# Patient Record
Sex: FEMALE | Race: WHITE | NOT HISPANIC OR LATINO | Employment: OTHER | URBAN - METROPOLITAN AREA
[De-identification: names, ages, dates, MRNs, and addresses within clinical notes are randomized per-mention and may not be internally consistent; named-entity substitution may affect disease eponyms.]

---

## 2017-01-11 ENCOUNTER — OFFICE VISIT - HEALTHEAST (OUTPATIENT)
Dept: CARDIOLOGY | Facility: CLINIC | Age: 72
End: 2017-01-11

## 2017-01-11 DIAGNOSIS — R07.9 CHEST PAIN, UNSPECIFIED TYPE: ICD-10-CM

## 2017-01-11 DIAGNOSIS — I10 ESSENTIAL HYPERTENSION, BENIGN: ICD-10-CM

## 2017-01-11 DIAGNOSIS — R94.39 ABNORMAL NUCLEAR STRESS TEST: ICD-10-CM

## 2017-01-11 ASSESSMENT — MIFFLIN-ST. JEOR: SCORE: 1486.99

## 2017-01-17 ENCOUNTER — COMMUNICATION - HEALTHEAST (OUTPATIENT)
Dept: FAMILY MEDICINE | Facility: CLINIC | Age: 72
End: 2017-01-17

## 2017-01-19 ENCOUNTER — HOSPITAL ENCOUNTER (OUTPATIENT)
Dept: CARDIOLOGY | Facility: HOSPITAL | Age: 72
Discharge: HOME | End: 2017-01-19
Attending: INTERNAL MEDICINE

## 2017-01-19 ENCOUNTER — HOSPITAL ENCOUNTER (OUTPATIENT)
Dept: NUCLEAR MEDICINE | Facility: HOSPITAL | Age: 72
Discharge: HOME | End: 2017-01-19
Attending: INTERNAL MEDICINE

## 2017-01-19 DIAGNOSIS — R07.9 CHEST PAIN, UNSPECIFIED TYPE: ICD-10-CM

## 2017-01-19 DIAGNOSIS — R94.39 ABNORMAL NUCLEAR STRESS TEST: ICD-10-CM

## 2017-01-19 LAB
CV STRESS CURRENT BP HE: NORMAL
CV STRESS CURRENT HR HE: 53
CV STRESS CURRENT HR HE: 57
CV STRESS CURRENT HR HE: 67
CV STRESS CURRENT HR HE: 69
CV STRESS CURRENT HR HE: 69
CV STRESS CURRENT HR HE: 72
CV STRESS CURRENT HR HE: 73
CV STRESS CURRENT HR HE: 74
CV STRESS CURRENT HR HE: 80
CV STRESS CURRENT HR HE: 81
CV STRESS CURRENT HR HE: 82
CV STRESS CURRENT HR HE: 83
CV STRESS DEVIATION TIME HE: NORMAL
CV STRESS EXERCISE STAGE HE: NORMAL
CV STRESS FINAL RESTING BP HE: NORMAL
CV STRESS FINAL RESTING HR HE: 67
CV STRESS MAX HR HE: 82
CV STRESS MAX TREADMILL GRADE HE: 0
CV STRESS MAX TREADMILL SPEED HE: 0
CV STRESS PEAK DIA BP HE: NORMAL
CV STRESS PEAK SYS BP HE: NORMAL
CV STRESS PHASE HE: NORMAL
CV STRESS PROTOCOL HE: NORMAL
CV STRESS RESTING PT POSITION HE: NORMAL
CV STRESS ST DEVIATION AMOUNT HE: NORMAL
CV STRESS ST DEVIATION ELEVATION HE: NORMAL
CV STRESS ST EVELATION AMOUNT HE: NORMAL
CV STRESS TEST TYPE HE: NORMAL
CV STRESS TOTAL STAGE TIME MIN 1 HE: NORMAL
NUC STRESS EJECTION FRACTION: 62 %
STRESS ECHO BASELINE BP: NORMAL
STRESS ECHO BASELINE HR: 53
STRESS ECHO LAST STRESS BP: NORMAL
STRESS ECHO LAST STRESS HR: 80

## 2017-02-06 ENCOUNTER — OFFICE VISIT - HEALTHEAST (OUTPATIENT)
Dept: FAMILY MEDICINE | Facility: CLINIC | Age: 72
End: 2017-02-06

## 2017-02-06 DIAGNOSIS — R07.89 ATYPICAL CHEST PAIN: ICD-10-CM

## 2017-02-06 DIAGNOSIS — Z00.00 HEALTH CARE MAINTENANCE: ICD-10-CM

## 2017-02-06 DIAGNOSIS — E11.9 DM2 (DIABETES MELLITUS, TYPE 2) (H): ICD-10-CM

## 2017-02-06 LAB — HBA1C MFR BLD: 5.6 % (ref 3.5–6)

## 2017-02-06 ASSESSMENT — MIFFLIN-ST. JEOR: SCORE: 1477.92

## 2017-02-07 ENCOUNTER — COMMUNICATION - HEALTHEAST (OUTPATIENT)
Dept: FAMILY MEDICINE | Facility: CLINIC | Age: 72
End: 2017-02-07

## 2017-02-28 ENCOUNTER — RECORDS - HEALTHEAST (OUTPATIENT)
Dept: ADMINISTRATIVE | Facility: OTHER | Age: 72
End: 2017-02-28

## 2017-03-03 ENCOUNTER — COMMUNICATION - HEALTHEAST (OUTPATIENT)
Dept: FAMILY MEDICINE | Facility: CLINIC | Age: 72
End: 2017-03-03

## 2017-03-03 DIAGNOSIS — I10 ESSENTIAL HYPERTENSION: ICD-10-CM

## 2017-03-10 ENCOUNTER — COMMUNICATION - HEALTHEAST (OUTPATIENT)
Dept: FAMILY MEDICINE | Facility: CLINIC | Age: 72
End: 2017-03-10

## 2017-03-10 ENCOUNTER — AMBULATORY - HEALTHEAST (OUTPATIENT)
Dept: FAMILY MEDICINE | Facility: CLINIC | Age: 72
End: 2017-03-10

## 2017-03-10 DIAGNOSIS — I10 ESSENTIAL HYPERTENSION: ICD-10-CM

## 2017-08-29 ENCOUNTER — OFFICE VISIT - HEALTHEAST (OUTPATIENT)
Dept: FAMILY MEDICINE | Facility: CLINIC | Age: 72
End: 2017-08-29

## 2017-08-29 DIAGNOSIS — S82.892A CLOSED LEFT ANKLE FRACTURE: ICD-10-CM

## 2017-08-30 ENCOUNTER — RECORDS - HEALTHEAST (OUTPATIENT)
Dept: ADMINISTRATIVE | Facility: OTHER | Age: 72
End: 2017-08-30

## 2017-08-31 ENCOUNTER — RECORDS - HEALTHEAST (OUTPATIENT)
Dept: ADMINISTRATIVE | Facility: OTHER | Age: 72
End: 2017-08-31

## 2017-08-31 ENCOUNTER — COMMUNICATION - HEALTHEAST (OUTPATIENT)
Dept: FAMILY MEDICINE | Facility: CLINIC | Age: 72
End: 2017-08-31

## 2017-08-31 ENCOUNTER — RECORDS - HEALTHEAST (OUTPATIENT)
Dept: BONE DENSITY | Facility: CLINIC | Age: 72
End: 2017-08-31

## 2017-08-31 DIAGNOSIS — Z78.0 ASYMPTOMATIC MENOPAUSAL STATE: ICD-10-CM

## 2017-08-31 DIAGNOSIS — S82.892A OTHER FRACTURE OF LEFT LOWER LEG, INITIAL ENCOUNTER FOR CLOSED FRACTURE: ICD-10-CM

## 2017-08-31 DIAGNOSIS — I10 ESSENTIAL HYPERTENSION: ICD-10-CM

## 2017-09-20 ENCOUNTER — RECORDS - HEALTHEAST (OUTPATIENT)
Dept: ADMINISTRATIVE | Facility: OTHER | Age: 72
End: 2017-09-20

## 2017-10-09 ENCOUNTER — RECORDS - HEALTHEAST (OUTPATIENT)
Dept: ADMINISTRATIVE | Facility: OTHER | Age: 72
End: 2017-10-09

## 2017-10-23 ENCOUNTER — RECORDS - HEALTHEAST (OUTPATIENT)
Dept: ADMINISTRATIVE | Facility: OTHER | Age: 72
End: 2017-10-23

## 2017-11-13 ENCOUNTER — RECORDS - HEALTHEAST (OUTPATIENT)
Dept: ADMINISTRATIVE | Facility: OTHER | Age: 72
End: 2017-11-13

## 2017-11-20 ENCOUNTER — RECORDS - HEALTHEAST (OUTPATIENT)
Dept: ADMINISTRATIVE | Facility: OTHER | Age: 72
End: 2017-11-20

## 2017-11-27 ENCOUNTER — HOSPITAL ENCOUNTER (OUTPATIENT)
Dept: CT IMAGING | Facility: HOSPITAL | Age: 72
Discharge: HOME | End: 2017-11-27
Attending: PHYSICIAN ASSISTANT

## 2017-11-27 DIAGNOSIS — K52.3 COLITIS, INDETERMINATE: ICD-10-CM

## 2018-01-30 ENCOUNTER — RECORDS - HEALTHEAST (OUTPATIENT)
Dept: ADMINISTRATIVE | Facility: OTHER | Age: 73
End: 2018-01-30

## 2018-02-23 ENCOUNTER — OFFICE VISIT - HEALTHEAST (OUTPATIENT)
Dept: FAMILY MEDICINE | Facility: CLINIC | Age: 73
End: 2018-02-23

## 2018-02-23 DIAGNOSIS — R04.0 EPISTAXIS: ICD-10-CM

## 2018-02-23 RX ORDER — MESALAMINE 1.2 G/1
1.2 TABLET, DELAYED RELEASE ORAL DAILY
Refills: 5 | Status: SHIPPED | COMMUNITY
Start: 2018-01-31

## 2018-02-23 ASSESSMENT — MIFFLIN-ST. JEOR: SCORE: 1494.02

## 2018-03-07 ENCOUNTER — OFFICE VISIT - HEALTHEAST (OUTPATIENT)
Dept: OTOLARYNGOLOGY | Facility: CLINIC | Age: 73
End: 2018-03-07

## 2018-03-07 DIAGNOSIS — R04.0 EPISTAXIS: ICD-10-CM

## 2018-03-14 ENCOUNTER — COMMUNICATION - HEALTHEAST (OUTPATIENT)
Dept: FAMILY MEDICINE | Facility: CLINIC | Age: 73
End: 2018-03-14

## 2018-03-14 DIAGNOSIS — I10 ESSENTIAL HYPERTENSION: ICD-10-CM

## 2018-08-14 ENCOUNTER — COMMUNICATION - HEALTHEAST (OUTPATIENT)
Dept: FAMILY MEDICINE | Facility: CLINIC | Age: 73
End: 2018-08-14

## 2018-08-14 DIAGNOSIS — I10 ESSENTIAL HYPERTENSION: ICD-10-CM

## 2018-09-05 ENCOUNTER — OFFICE VISIT - HEALTHEAST (OUTPATIENT)
Dept: OTOLARYNGOLOGY | Facility: CLINIC | Age: 73
End: 2018-09-05

## 2018-09-05 DIAGNOSIS — R04.0 EPISTAXIS: ICD-10-CM

## 2018-09-19 ENCOUNTER — OFFICE VISIT - HEALTHEAST (OUTPATIENT)
Dept: FAMILY MEDICINE | Facility: CLINIC | Age: 73
End: 2018-09-19

## 2018-09-19 DIAGNOSIS — I10 ESSENTIAL HYPERTENSION, BENIGN: ICD-10-CM

## 2018-09-19 DIAGNOSIS — Z12.31 VISIT FOR SCREENING MAMMOGRAM: ICD-10-CM

## 2018-09-19 DIAGNOSIS — Z00.00 MEDICARE ANNUAL WELLNESS VISIT, SUBSEQUENT: ICD-10-CM

## 2018-09-19 DIAGNOSIS — J45.901 ASTHMA EXACERBATION: ICD-10-CM

## 2018-09-19 DIAGNOSIS — K86.1 IDIOPATHIC CHRONIC PANCREATITIS (H): ICD-10-CM

## 2018-09-19 LAB
ALBUMIN SERPL-MCNC: 3.9 G/DL (ref 3.5–5)
ALBUMIN UR-MCNC: NEGATIVE MG/DL
ALP SERPL-CCNC: 88 U/L (ref 45–120)
ALT SERPL W P-5'-P-CCNC: 16 U/L (ref 0–45)
ANION GAP SERPL CALCULATED.3IONS-SCNC: 8 MMOL/L (ref 5–18)
APPEARANCE UR: CLEAR
AST SERPL W P-5'-P-CCNC: 46 U/L (ref 0–40)
BACTERIA #/AREA URNS HPF: ABNORMAL HPF
BILIRUB SERPL-MCNC: 1 MG/DL (ref 0–1)
BILIRUB UR QL STRIP: NEGATIVE
BUN SERPL-MCNC: 18 MG/DL (ref 8–28)
CALCIUM SERPL-MCNC: 10.5 MG/DL (ref 8.5–10.5)
CHLORIDE BLD-SCNC: 105 MMOL/L (ref 98–107)
CHOLEST SERPL-MCNC: 208 MG/DL
CO2 SERPL-SCNC: 28 MMOL/L (ref 22–31)
COLOR UR AUTO: YELLOW
CREAT SERPL-MCNC: 0.73 MG/DL (ref 0.6–1.1)
FASTING STATUS PATIENT QL REPORTED: YES
GFR SERPL CREATININE-BSD FRML MDRD: >60 ML/MIN/1.73M2
GLUCOSE BLD-MCNC: 86 MG/DL (ref 70–125)
GLUCOSE UR STRIP-MCNC: NEGATIVE MG/DL
HDLC SERPL-MCNC: 54 MG/DL
HGB UR QL STRIP: NEGATIVE
KETONES UR STRIP-MCNC: NEGATIVE MG/DL
LDLC SERPL CALC-MCNC: 139 MG/DL
LEUKOCYTE ESTERASE UR QL STRIP: ABNORMAL
MUCOUS THREADS #/AREA URNS LPF: ABNORMAL LPF
NITRATE UR QL: NEGATIVE
PH UR STRIP: 7 [PH] (ref 5–8)
POTASSIUM BLD-SCNC: 4.6 MMOL/L (ref 3.5–5)
PROT SERPL-MCNC: 7.3 G/DL (ref 6–8)
RBC #/AREA URNS AUTO: ABNORMAL HPF
SODIUM SERPL-SCNC: 141 MMOL/L (ref 136–145)
SP GR UR STRIP: 1.01 (ref 1–1.03)
SQUAMOUS #/AREA URNS AUTO: ABNORMAL LPF
TRIGL SERPL-MCNC: 76 MG/DL
TSH SERPL DL<=0.005 MIU/L-ACNC: 0.86 UIU/ML (ref 0.3–5)
UROBILINOGEN UR STRIP-ACNC: ABNORMAL
WBC #/AREA URNS AUTO: ABNORMAL HPF
WBC CLUMPS #/AREA URNS HPF: PRESENT /[HPF]

## 2018-09-19 ASSESSMENT — MIFFLIN-ST. JEOR: SCORE: 1500.6

## 2018-09-20 LAB
ATRIAL RATE - MUSE: 51 BPM
BACTERIA SPEC CULT: NORMAL
DIASTOLIC BLOOD PRESSURE - MUSE: NORMAL MMHG
INTERPRETATION ECG - MUSE: NORMAL
P AXIS - MUSE: 85 DEGREES
PR INTERVAL - MUSE: 150 MS
QRS DURATION - MUSE: 90 MS
QT - MUSE: 456 MS
QTC - MUSE: 420 MS
R AXIS - MUSE: 42 DEGREES
SYSTOLIC BLOOD PRESSURE - MUSE: NORMAL MMHG
T AXIS - MUSE: 43 DEGREES
VENTRICULAR RATE- MUSE: 51 BPM

## 2018-11-01 ENCOUNTER — COMMUNICATION - HEALTHEAST (OUTPATIENT)
Dept: FAMILY MEDICINE | Facility: CLINIC | Age: 73
End: 2018-11-01

## 2018-11-01 DIAGNOSIS — I10 ESSENTIAL HYPERTENSION: ICD-10-CM

## 2018-12-10 ENCOUNTER — COMMUNICATION - HEALTHEAST (OUTPATIENT)
Dept: FAMILY MEDICINE | Facility: CLINIC | Age: 73
End: 2018-12-10

## 2018-12-14 ENCOUNTER — OFFICE VISIT - HEALTHEAST (OUTPATIENT)
Dept: FAMILY MEDICINE | Facility: CLINIC | Age: 73
End: 2018-12-14

## 2018-12-14 DIAGNOSIS — R73.01 ELEVATED FASTING GLUCOSE: ICD-10-CM

## 2018-12-14 DIAGNOSIS — R80.9 MICROALBUMINURIA: ICD-10-CM

## 2018-12-14 DIAGNOSIS — R73.03 PREDIABETES: ICD-10-CM

## 2018-12-14 DIAGNOSIS — E66.01 MORBID OBESITY (H): ICD-10-CM

## 2018-12-14 LAB
CREAT UR-MCNC: 28.1 MG/DL
HBA1C MFR BLD: 5.9 % (ref 3.5–6)
MICROALBUMIN UR-MCNC: 0.58 MG/DL (ref 0–1.99)
MICROALBUMIN/CREAT UR: 20.6 MG/G

## 2018-12-14 RX ORDER — CHLORAL HYDRATE 500 MG
1 CAPSULE ORAL
Status: SHIPPED | COMMUNITY
Start: 2018-12-14 | End: 2024-09-19

## 2018-12-14 ASSESSMENT — MIFFLIN-ST. JEOR: SCORE: 1495.5

## 2019-01-03 ENCOUNTER — RECORDS - HEALTHEAST (OUTPATIENT)
Dept: ADMINISTRATIVE | Facility: OTHER | Age: 74
End: 2019-01-03

## 2019-03-06 ENCOUNTER — COMMUNICATION - HEALTHEAST (OUTPATIENT)
Dept: FAMILY MEDICINE | Facility: CLINIC | Age: 74
End: 2019-03-06

## 2019-03-06 DIAGNOSIS — I10 ESSENTIAL HYPERTENSION: ICD-10-CM

## 2019-06-04 ENCOUNTER — COMMUNICATION - HEALTHEAST (OUTPATIENT)
Dept: FAMILY MEDICINE | Facility: CLINIC | Age: 74
End: 2019-06-04

## 2019-06-04 DIAGNOSIS — I10 ESSENTIAL HYPERTENSION: ICD-10-CM

## 2019-07-29 ENCOUNTER — COMMUNICATION - HEALTHEAST (OUTPATIENT)
Dept: FAMILY MEDICINE | Facility: CLINIC | Age: 74
End: 2019-07-29

## 2019-07-29 DIAGNOSIS — I10 ESSENTIAL HYPERTENSION: ICD-10-CM

## 2019-09-24 ENCOUNTER — RECORDS - HEALTHEAST (OUTPATIENT)
Dept: ADMINISTRATIVE | Facility: OTHER | Age: 74
End: 2019-09-24

## 2019-09-25 ENCOUNTER — OFFICE VISIT - HEALTHEAST (OUTPATIENT)
Dept: FAMILY MEDICINE | Facility: CLINIC | Age: 74
End: 2019-09-25

## 2019-09-25 DIAGNOSIS — K57.32 DIVERTICULITIS OF COLON: ICD-10-CM

## 2019-09-25 DIAGNOSIS — E66.811 CLASS 1 OBESITY DUE TO EXCESS CALORIES WITH BODY MASS INDEX (BMI) OF 31.0 TO 31.9 IN ADULT, UNSPECIFIED WHETHER SERIOUS COMORBIDITY PRESENT: ICD-10-CM

## 2019-09-25 DIAGNOSIS — E66.09 CLASS 1 OBESITY DUE TO EXCESS CALORIES WITH BODY MASS INDEX (BMI) OF 31.0 TO 31.9 IN ADULT, UNSPECIFIED WHETHER SERIOUS COMORBIDITY PRESENT: ICD-10-CM

## 2019-09-25 DIAGNOSIS — Z00.00 MEDICARE ANNUAL WELLNESS VISIT, SUBSEQUENT: ICD-10-CM

## 2019-09-25 DIAGNOSIS — J45.901 ASTHMA EXACERBATION: ICD-10-CM

## 2019-09-25 DIAGNOSIS — Z23 ENCOUNTER FOR IMMUNIZATION: ICD-10-CM

## 2019-09-25 DIAGNOSIS — Z12.31 VISIT FOR SCREENING MAMMOGRAM: ICD-10-CM

## 2019-09-25 DIAGNOSIS — E03.9 ACQUIRED HYPOTHYROIDISM: ICD-10-CM

## 2019-09-25 DIAGNOSIS — I10 ESSENTIAL HYPERTENSION, BENIGN: ICD-10-CM

## 2019-09-25 LAB
ALBUMIN SERPL-MCNC: 3.7 G/DL (ref 3.5–5)
ALBUMIN UR-MCNC: NEGATIVE MG/DL
ALP SERPL-CCNC: 107 U/L (ref 45–120)
ALT SERPL W P-5'-P-CCNC: 24 U/L (ref 0–45)
ANION GAP SERPL CALCULATED.3IONS-SCNC: 6 MMOL/L (ref 5–18)
APPEARANCE UR: ABNORMAL
AST SERPL W P-5'-P-CCNC: 52 U/L (ref 0–40)
BACTERIA #/AREA URNS HPF: ABNORMAL HPF
BILIRUB SERPL-MCNC: 0.7 MG/DL (ref 0–1)
BILIRUB UR QL STRIP: NEGATIVE
BUN SERPL-MCNC: 10 MG/DL (ref 8–28)
CALCIUM SERPL-MCNC: 10.3 MG/DL (ref 8.5–10.5)
CHLORIDE BLD-SCNC: 105 MMOL/L (ref 98–107)
CHOLEST SERPL-MCNC: 194 MG/DL
CO2 SERPL-SCNC: 29 MMOL/L (ref 22–31)
COLOR UR AUTO: YELLOW
CREAT SERPL-MCNC: 0.73 MG/DL (ref 0.6–1.1)
ERYTHROCYTE [DISTWIDTH] IN BLOOD BY AUTOMATED COUNT: 12.5 % (ref 11–14.5)
FASTING STATUS PATIENT QL REPORTED: YES
GFR SERPL CREATININE-BSD FRML MDRD: >60 ML/MIN/1.73M2
GLUCOSE BLD-MCNC: 110 MG/DL (ref 70–125)
GLUCOSE UR STRIP-MCNC: NEGATIVE MG/DL
HCT VFR BLD AUTO: 44.7 % (ref 35–47)
HDLC SERPL-MCNC: 50 MG/DL
HGB BLD-MCNC: 15.6 G/DL (ref 12–16)
HGB UR QL STRIP: NEGATIVE
KETONES UR STRIP-MCNC: NEGATIVE MG/DL
LDLC SERPL CALC-MCNC: 129 MG/DL
LEUKOCYTE ESTERASE UR QL STRIP: ABNORMAL
MCH RBC QN AUTO: 30.8 PG (ref 27–34)
MCHC RBC AUTO-ENTMCNC: 35 G/DL (ref 32–36)
MCV RBC AUTO: 88 FL (ref 80–100)
NITRATE UR QL: NEGATIVE
PH UR STRIP: 6 [PH] (ref 5–8)
PLATELET # BLD AUTO: 460 THOU/UL (ref 140–440)
PMV BLD AUTO: 7.8 FL (ref 7–10)
POTASSIUM BLD-SCNC: 4.6 MMOL/L (ref 3.5–5)
PROT SERPL-MCNC: 7.4 G/DL (ref 6–8)
RBC # BLD AUTO: 5.07 MILL/UL (ref 3.8–5.4)
RBC #/AREA URNS AUTO: ABNORMAL HPF
SODIUM SERPL-SCNC: 140 MMOL/L (ref 136–145)
SP GR UR STRIP: 1.02 (ref 1–1.03)
SQUAMOUS #/AREA URNS AUTO: ABNORMAL LPF
TRIGL SERPL-MCNC: 74 MG/DL
TSH SERPL DL<=0.005 MIU/L-ACNC: 0.92 UIU/ML (ref 0.3–5)
UROBILINOGEN UR STRIP-ACNC: ABNORMAL
WBC #/AREA URNS AUTO: ABNORMAL HPF
WBC CLUMPS #/AREA URNS HPF: PRESENT /[HPF]
WBC: 9.2 THOU/UL (ref 4–11)

## 2019-09-25 RX ORDER — ALBUTEROL SULFATE 90 UG/1
2 AEROSOL, METERED RESPIRATORY (INHALATION) EVERY 6 HOURS PRN
Qty: 2 INHALER | Refills: 3 | Status: SHIPPED | OUTPATIENT
Start: 2019-09-25 | End: 2022-12-16

## 2019-09-25 ASSESSMENT — ANXIETY QUESTIONNAIRES
1. FEELING NERVOUS, ANXIOUS, OR ON EDGE: NOT AT ALL
2. NOT BEING ABLE TO STOP OR CONTROL WORRYING: NOT AT ALL

## 2019-09-25 ASSESSMENT — MIFFLIN-ST. JEOR: SCORE: 1444.58

## 2019-09-26 LAB — BACTERIA SPEC CULT: NO GROWTH

## 2019-10-02 ENCOUNTER — HOSPITAL ENCOUNTER (OUTPATIENT)
Dept: CT IMAGING | Facility: HOSPITAL | Age: 74
Discharge: HOME | End: 2019-10-02
Attending: PHYSICIAN ASSISTANT

## 2019-10-02 DIAGNOSIS — R10.32 LLQ ABDOMINAL PAIN: ICD-10-CM

## 2019-11-06 ENCOUNTER — HOSPITAL ENCOUNTER (OUTPATIENT)
Dept: MAMMOGRAPHY | Facility: CLINIC | Age: 74
Discharge: HOME | End: 2019-11-06
Attending: FAMILY MEDICINE

## 2019-11-06 DIAGNOSIS — Z12.31 VISIT FOR SCREENING MAMMOGRAM: ICD-10-CM

## 2019-11-27 ENCOUNTER — RECORDS - HEALTHEAST (OUTPATIENT)
Dept: ADMINISTRATIVE | Facility: OTHER | Age: 74
End: 2019-11-27

## 2019-12-01 ENCOUNTER — COMMUNICATION - HEALTHEAST (OUTPATIENT)
Dept: FAMILY MEDICINE | Facility: CLINIC | Age: 74
End: 2019-12-01

## 2019-12-01 DIAGNOSIS — I10 ESSENTIAL HYPERTENSION: ICD-10-CM

## 2019-12-05 ENCOUNTER — RECORDS - HEALTHEAST (OUTPATIENT)
Dept: ADMINISTRATIVE | Facility: OTHER | Age: 74
End: 2019-12-05

## 2020-02-11 ENCOUNTER — RECORDS - HEALTHEAST (OUTPATIENT)
Dept: ADMINISTRATIVE | Facility: OTHER | Age: 75
End: 2020-02-11

## 2020-05-11 ENCOUNTER — COMMUNICATION - HEALTHEAST (OUTPATIENT)
Dept: FAMILY MEDICINE | Facility: CLINIC | Age: 75
End: 2020-05-11

## 2020-05-11 DIAGNOSIS — I10 ESSENTIAL HYPERTENSION: ICD-10-CM

## 2020-05-12 ENCOUNTER — COMMUNICATION - HEALTHEAST (OUTPATIENT)
Dept: FAMILY MEDICINE | Facility: CLINIC | Age: 75
End: 2020-05-12

## 2020-05-12 DIAGNOSIS — I10 ESSENTIAL HYPERTENSION: ICD-10-CM

## 2020-11-03 ENCOUNTER — COMMUNICATION - HEALTHEAST (OUTPATIENT)
Dept: FAMILY MEDICINE | Facility: CLINIC | Age: 75
End: 2020-11-03

## 2020-11-03 DIAGNOSIS — I10 ESSENTIAL HYPERTENSION: ICD-10-CM

## 2020-11-06 RX ORDER — CARVEDILOL 12.5 MG/1
TABLET ORAL
Qty: 180 TABLET | Refills: 0 | Status: SHIPPED | OUTPATIENT
Start: 2020-11-06 | End: 2022-12-08

## 2020-12-19 ENCOUNTER — COMMUNICATION - HEALTHEAST (OUTPATIENT)
Dept: FAMILY MEDICINE | Facility: CLINIC | Age: 75
End: 2020-12-19

## 2020-12-19 DIAGNOSIS — I10 ESSENTIAL HYPERTENSION: ICD-10-CM

## 2021-02-25 ENCOUNTER — RECORDS - HEALTHEAST (OUTPATIENT)
Dept: ADMINISTRATIVE | Facility: OTHER | Age: 76
End: 2021-02-25

## 2021-03-22 ENCOUNTER — COMMUNICATION - HEALTHEAST (OUTPATIENT)
Dept: FAMILY MEDICINE | Facility: CLINIC | Age: 76
End: 2021-03-22

## 2021-03-22 DIAGNOSIS — I10 ESSENTIAL HYPERTENSION: ICD-10-CM

## 2021-03-31 ENCOUNTER — OFFICE VISIT - HEALTHEAST (OUTPATIENT)
Dept: FAMILY MEDICINE | Facility: CLINIC | Age: 76
End: 2021-03-31

## 2021-03-31 DIAGNOSIS — Z00.00 MEDICARE ANNUAL WELLNESS VISIT, SUBSEQUENT: ICD-10-CM

## 2021-03-31 DIAGNOSIS — K86.1 IDIOPATHIC CHRONIC PANCREATITIS (H): ICD-10-CM

## 2021-03-31 DIAGNOSIS — K21.00 GASTROESOPHAGEAL REFLUX DISEASE WITH ESOPHAGITIS, UNSPECIFIED WHETHER HEMORRHAGE: ICD-10-CM

## 2021-03-31 DIAGNOSIS — I10 ESSENTIAL HYPERTENSION, BENIGN: ICD-10-CM

## 2021-03-31 DIAGNOSIS — K57.30 DIVERTICULOSIS OF COLON: ICD-10-CM

## 2021-03-31 LAB
ALBUMIN SERPL-MCNC: 3.8 G/DL (ref 3.5–5)
ALBUMIN UR-MCNC: NEGATIVE G/DL
ALP SERPL-CCNC: 110 U/L (ref 45–120)
ALT SERPL W P-5'-P-CCNC: 20 U/L (ref 0–45)
ANION GAP SERPL CALCULATED.3IONS-SCNC: 8 MMOL/L (ref 5–18)
APPEARANCE UR: CLEAR
AST SERPL W P-5'-P-CCNC: 45 U/L (ref 0–40)
BACTERIA #/AREA URNS HPF: ABNORMAL /[HPF]
BILIRUB SERPL-MCNC: 0.8 MG/DL (ref 0–1)
BILIRUB UR QL STRIP: NEGATIVE
BUN SERPL-MCNC: 13 MG/DL (ref 8–28)
CALCIUM SERPL-MCNC: 10 MG/DL (ref 8.5–10.5)
CHLORIDE BLD-SCNC: 103 MMOL/L (ref 98–107)
CHOLEST SERPL-MCNC: 214 MG/DL
CO2 SERPL-SCNC: 29 MMOL/L (ref 22–31)
COLOR UR AUTO: YELLOW
CREAT SERPL-MCNC: 0.72 MG/DL (ref 0.6–1.1)
ERYTHROCYTE [DISTWIDTH] IN BLOOD BY AUTOMATED COUNT: 12.1 % (ref 11–14.5)
FASTING STATUS PATIENT QL REPORTED: YES
GFR SERPL CREATININE-BSD FRML MDRD: >60 ML/MIN/1.73M2
GLUCOSE BLD-MCNC: 110 MG/DL (ref 70–125)
GLUCOSE UR STRIP-MCNC: NEGATIVE MG/DL
HCT VFR BLD AUTO: 44.4 % (ref 35–47)
HDLC SERPL-MCNC: 57 MG/DL
HGB BLD-MCNC: 15.1 G/DL (ref 12–16)
HGB UR QL STRIP: NEGATIVE
KETONES UR STRIP-MCNC: NEGATIVE MG/DL
LDLC SERPL CALC-MCNC: 138 MG/DL
LEUKOCYTE ESTERASE UR QL STRIP: ABNORMAL
MCH RBC QN AUTO: 30.2 PG (ref 27–34)
MCHC RBC AUTO-ENTMCNC: 34 G/DL (ref 32–36)
MCV RBC AUTO: 89 FL (ref 80–100)
NITRATE UR QL: NEGATIVE
PH UR STRIP: 5.5 [PH] (ref 5–8)
PLATELET # BLD AUTO: 431 THOU/UL (ref 140–440)
PMV BLD AUTO: 9.7 FL (ref 7–10)
POTASSIUM BLD-SCNC: 4.1 MMOL/L (ref 3.5–5)
PROT SERPL-MCNC: 7.2 G/DL (ref 6–8)
RBC # BLD AUTO: 5 MILL/UL (ref 3.8–5.4)
RBC #/AREA URNS AUTO: ABNORMAL HPF
SODIUM SERPL-SCNC: 140 MMOL/L (ref 136–145)
SP GR UR STRIP: 1.02 (ref 1–1.03)
SQUAMOUS #/AREA URNS AUTO: ABNORMAL LPF
TRIGL SERPL-MCNC: 95 MG/DL
UROBILINOGEN UR STRIP-ACNC: ABNORMAL
WBC #/AREA URNS AUTO: ABNORMAL HPF
WBC: 7.6 THOU/UL (ref 4–11)

## 2021-03-31 RX ORDER — FAMOTIDINE 20 MG/1
20 TABLET, FILM COATED ORAL 2 TIMES DAILY
Qty: 60 TABLET | Refills: 1 | Status: SHIPPED | OUTPATIENT
Start: 2021-03-31

## 2021-03-31 ASSESSMENT — MIFFLIN-ST. JEOR: SCORE: 1466.58

## 2021-04-01 ENCOUNTER — COMMUNICATION - HEALTHEAST (OUTPATIENT)
Dept: FAMILY MEDICINE | Facility: CLINIC | Age: 76
End: 2021-04-01

## 2021-04-01 LAB — BACTERIA SPEC CULT: NO GROWTH

## 2021-04-06 ENCOUNTER — COMMUNICATION - HEALTHEAST (OUTPATIENT)
Dept: FAMILY MEDICINE | Facility: CLINIC | Age: 76
End: 2021-04-06

## 2021-04-26 ENCOUNTER — COMMUNICATION - HEALTHEAST (OUTPATIENT)
Dept: FAMILY MEDICINE | Facility: CLINIC | Age: 76
End: 2021-04-26

## 2021-04-26 DIAGNOSIS — I10 ESSENTIAL HYPERTENSION: ICD-10-CM

## 2021-04-27 RX ORDER — AMLODIPINE BESYLATE 5 MG/1
5 TABLET ORAL DAILY
Qty: 90 TABLET | Refills: 3 | Status: SHIPPED | OUTPATIENT
Start: 2021-04-27 | End: 2022-05-01

## 2021-05-25 ENCOUNTER — RECORDS - HEALTHEAST (OUTPATIENT)
Dept: ADMINISTRATIVE | Facility: CLINIC | Age: 76
End: 2021-05-25

## 2021-05-29 NOTE — TELEPHONE ENCOUNTER
Refill Approved    Rx renewed per Medication Renewal Policy. Medication was last renewed on 3/12/19.    Em Casey, [facility] Triage/Med Refill 6/5/2019     Requested Prescriptions   Pending Prescriptions Disp Refills     amLODIPine (NORVASC) 5 MG tablet [Pharmacy Med Name: amLODIPine Besylate Oral Tablet 5 MG] 90 tablet 0     Sig: TAKE ONE TABLET BY MOUTH ONE TIME DAILY.       Calcium-Channel Blockers Protocol Passed - 6/4/2019  7:44 PM        Passed - PCP or prescribing provider visit in past 12 months or next 3 months     Last office visit with prescriber/PCP: 2/23/2018 Niraj Biswas MD OR same dept: 12/14/2018 Monisha Griffiths MD OR same specialty: 12/14/2018 Monisha Griffiths MD  Last physical: 9/19/2018 Last MTM visit: Visit date not found   Next visit within 3 mo: Visit date not found  Next physical within 3 mo: Visit date not found  Prescriber OR PCP: Niraj Biswas MD  Last diagnosis associated with med order: 1. Essential hypertension  - amLODIPine (NORVASC) 5 MG tablet [Pharmacy Med Name: amLODIPine Besylate Oral Tablet 5 MG]; TAKE ONE TABLET BY MOUTH ONE TIME DAILY   Dispense: 90 tablet; Refill: 0    If protocol passes may refill for 12 months if within 3 months of last provider visit (or a total of 15 months).             Passed - Blood pressure filed in past 12 months     BP Readings from Last 1 Encounters:   12/14/18 120/80

## 2021-05-30 VITALS — WEIGHT: 236 LBS | HEIGHT: 60 IN | BODY MASS INDEX: 46.33 KG/M2

## 2021-05-30 VITALS — BODY MASS INDEX: 45.94 KG/M2 | WEIGHT: 234 LBS | HEIGHT: 60 IN

## 2021-05-30 NOTE — TELEPHONE ENCOUNTER
Refill Approved    Rx renewed per Medication Renewal Policy. Medication was last renewed on 11/7/2018.  Last OV 12/14/2018.    Era Angel, [facility] Triage/Med Refill 7/29/2019     Requested Prescriptions   Pending Prescriptions Disp Refills     carvedilol (COREG) 12.5 MG tablet [Pharmacy Med Name: Carvedilol Oral Tablet 12.5 MG] 60 tablet 0     Sig: Take 1 tablet by mouth 2 times a day with meals.       Beta-Blockers Refill Protocol Passed - 7/29/2019  7:00 AM        Passed - PCP or prescribing provider visit in past 12 months or next 3 months     Last office visit with prescriber/PCP: Visit date not found OR same dept: 12/14/2018 Monisha Griffiths MD OR same specialty: 12/14/2018 Monisha Griffiths MD  Last physical: Visit date not found Last MTM visit: Visit date not found   Next visit within 3 mo: Visit date not found  Next physical within 3 mo: Visit date not found  Prescriber OR PCP: Semaj Saenz MD  Last diagnosis associated with med order: 1. Essential hypertension  - carvedilol (COREG) 12.5 MG tablet [Pharmacy Med Name: Carvedilol Oral Tablet 12.5 MG]; Take 1 tablet by mouth 2 times a day with meals.  Dispense: 60 tablet; Refill: 0    If protocol passes may refill for 12 months if within 3 months of last provider visit (or a total of 15 months).             Passed - Blood pressure filed in past 12 months     BP Readings from Last 1 Encounters:   12/14/18 120/80

## 2021-05-31 ENCOUNTER — RECORDS - HEALTHEAST (OUTPATIENT)
Dept: ADMINISTRATIVE | Facility: CLINIC | Age: 76
End: 2021-05-31

## 2021-05-31 VITALS — BODY MASS INDEX: 44.52 KG/M2 | HEIGHT: 61 IN | WEIGHT: 235.8 LBS

## 2021-06-01 ENCOUNTER — RECORDS - HEALTHEAST (OUTPATIENT)
Dept: ADMINISTRATIVE | Facility: CLINIC | Age: 76
End: 2021-06-01

## 2021-06-01 VITALS — BODY MASS INDEX: 44.46 KG/M2 | WEIGHT: 235.5 LBS | HEIGHT: 61 IN

## 2021-06-01 NOTE — PROGRESS NOTES
Assessment and Plan:   The patient is a 73-year-old who presents for subsequent annual wellness examination; medical problems include a past medical history of pancreatic disease including acute and chronic pancreatitis.  Patient also has mild IBS.  Patient has suffered from exogenous obesity with BMI higher than 35.  Patient also has chronic intermittent asthma currently in remission and under control with bronchodilators.Patient has had a past medical history of diverticulitis and recently has had some blood in her stool.  She is under the care of gastroenterology they have scheduled a CT scan for next week.  Clinical suspicion here is that her diverticulitis is acting up again.  She also has external hemorrhoids which have been bothering her this may be a source of the bleeding.  Otherwise a review of systems is essentially unremarkable she denies any visual disturbances hearing loss dysphasia shortness of breath dyspnea chest pain angina abdominal pain diarrhea constipation urgency frequency dysuria weight has been down a little bit.  She lives at home with her  there have been no social changes.  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  All medical questions asked were answered medical review was also done.  We will see her for follow-up at her annual examination sooner if problems develop    1. Medicare annual wellness visit, subsequent    - Comprehensive Metabolic Panel  - Urinalysis-UC if Indicated    2. Benign Essential Hypertension    - Comprehensive Metabolic Panel  - HM2(CBC w/o Differential)  - Lipid Cascade  - Thyroid Cascade  - Urinalysis-UC if Indicated    3. Acquired hypothyroidism    - Lipid Cascade  - Thyroid Wayne    4. Class 1 obesity due to excess calories with body mass index (BMI) of 31.0 to 31.9 in adult, unspecified whether serious comorbidity present    - Thyroid Wayne    5. Visit for screening mammogram    - Mammo Screening Bilateral;  Future    6. Encounter for immunization    - Influenza High Dose, Seasonal 65+ yrs    7. Asthma exacerbation    - albuterol (PROAIR HFA;PROVENTIL HFA;VENTOLIN HFA) 90 mcg/actuation inhaler; Inhale 2 puffs every 6 (six) hours as needed for wheezing or shortness of breath.  Dispense: 2 Inhaler; Refill: 3    8. Diverticulitis of colon  Follow-up with gastroenterology    The patient's current medical problems were reviewed.    I have had an Advance Directives discussion with the patient.  The following health maintenance schedule was reviewed with the patient and provided in printed form in the after visit summary:   Health Maintenance   Topic Date Due     HEPATITIS C SCREENING  1945     ZOSTER VACCINES (1 of 2) 10/13/1995     MEDICARE ANNUAL WELLNESS VISIT  10/13/2010     PNEUMOCOCCAL IMMUNIZATION 65+ LOW/MEDIUM RISK (1 of 2 - PCV13) 10/13/2010     MAMMOGRAM  06/21/2018     DXA SCAN  08/31/2019     FALL RISK ASSESSMENT  09/25/2020     ADVANCE CARE PLANNING  09/19/2023     TD 18+ HE  02/06/2027     COLONOSCOPY  10/23/2027     INFLUENZA VACCINE RULE BASED  Completed        Subjective:   Chief Complaint: Naatsha Thomas is an 73 y.o. female here for an Annual Wellness visit.   HPI: See under assessment and plan    Review of Systems: 14 point review of systems negative please see above.  The rest of the review of systems are negative for all systems.    Patient Care Team:  Niraj Biswas MD as PCP - General  Niraj Biswas MD as Assigned PCP     Patient Active Problem List   Diagnosis     Hypothyroidism     Overweight     Benign Essential Hypertension     External Hemorrhoids     Chronic Recurrent Pancreatitis     Essential Hematuria     Cyst and pseudocyst of pancreas     Diverticulosis of colon     Morbid obesity (H)     Prediabetes     Past Medical History:   Diagnosis Date     High cholesterol      Hypertension      Pancreatitis       Past Surgical History:   Procedure Laterality Date     CHOLECYSTECTOMY        JOINT REPLACEMENT       SPINE SURGERY        Family History   Problem Relation Age of Onset     Early death Mother      Cancer Father      No Medical Problems Brother      Breast cancer Other       Social History     Socioeconomic History     Marital status:      Spouse name: Not on file     Number of children: Not on file     Years of education: Not on file     Highest education level: Not on file   Occupational History     Not on file   Social Needs     Financial resource strain: Not on file     Food insecurity:     Worry: Not on file     Inability: Not on file     Transportation needs:     Medical: Not on file     Non-medical: Not on file   Tobacco Use     Smoking status: Former Smoker     Types: Cigarettes     Last attempt to quit:      Years since quittin.7     Smokeless tobacco: Never Used   Substance and Sexual Activity     Alcohol use: No     Comment: very rre social drink     Drug use: No     Sexual activity: Not on file   Lifestyle     Physical activity:     Days per week: Not on file     Minutes per session: Not on file     Stress: Not on file   Relationships     Social connections:     Talks on phone: Not on file     Gets together: Not on file     Attends Shinto service: Not on file     Active member of club or organization: Not on file     Attends meetings of clubs or organizations: Not on file     Relationship status: Not on file     Intimate partner violence:     Fear of current or ex partner: Not on file     Emotionally abused: Not on file     Physically abused: Not on file     Forced sexual activity: Not on file   Other Topics Concern     Not on file   Social History Narrative     Not on file      Current Outpatient Medications   Medication Sig Dispense Refill     albuterol (PROAIR HFA;PROVENTIL HFA;VENTOLIN HFA) 90 mcg/actuation inhaler Inhale 2 puffs every 6 (six) hours as needed for wheezing or shortness of breath. 2 Inhaler 3     amLODIPine (NORVASC) 5 MG tablet TAKE ONE  "TABLET BY MOUTH ONE TIME DAILY. 90 tablet 1     aspirin 81 MG EC tablet Take 81 mg by mouth daily.       calcium carbonate-vitamin D2 500 mg(1,250mg) -200 unit tablet Take 1 tablet by mouth 2 (two) times a day.       calcium, as carbonate, (OS-KELSY) 500 mg calcium (1,250 mg) tablet Take 500 mg by mouth.       carvedilol (COREG) 12.5 MG tablet Take 1 tablet by mouth 2 times a day with meals. 180 tablet 1     CHOLECALCIFEROL, VITAMIN D3, (VITAMIN D3 ORAL) Take 3,000 Int'l Units by mouth.       co-enzyme Q-10 30 mg capsule Take 30 mg by mouth 3 (three) times a day.       CYANOCOBALAMIN, VITAMIN B-12, (VITAMIN B-12 ORAL) Take by mouth.       KRILL OIL ORAL Take by mouth.       MAGNESIUM ORAL Take by mouth.       mesalamine (LIALDA) 1.2 gram EC tablet Take 1.2 g by mouth daily.  5     omega-3 fatty acids-fish oil 340-1,000 mg cap Take 1 capsule by mouth.       POTASSIUM CHLORIDE ORAL Take by mouth.       TURMERIC ROOT EXTRACT ORAL Take by mouth.       VITAMIN E ACETATE (VITAMIN E ORAL) Take by mouth.       No current facility-administered medications for this visit.       Objective:   Vital Signs:   Visit Vitals  /70   Pulse 72   Ht 4' 11.5\" (1.511 m)   Wt (!) 228 lb 6.4 oz (103.6 kg)   BMI 45.36 kg/m         VisionScreening:  No exam data present     PHYSICAL EXAM  General Appearance:  Alert, cooperative, no distress  Head:  Normocephalic, no obvious abnormality  Ears: TM anatomy normal  Eyes:  PERRL, EOM's intact, conjunctiva and corneas clear  Nose:  Nares symmetrical, septum midline, mucosa pink, no sinus tenderness  Throat:  Lips, tongue, and mucosa are moist, pink, and intact  Neck:  Supple, symmetrical, trachea midline, no adenopathy; thyroid: no enlargement, symmetric,no tenderness/mass/nodules; no carotid bruit, no JVD  Back:  Symmetrical, no curvature, ROM normal, no CVA tenderness  Chest/Breast:  No mass or tenderness  Lungs:  Clear to auscultation bilaterally, respirations unlabored   Heart:  Normal PMI, " regular rate & rhythm, S1 and S2 normal, no murmurs, rubs, or gallops  Abdomen:  Soft, non-tender, bowel sounds active all four quadrants, no mass, or organomegaly overweight body habitus no guarding  Musculoskeletal:  Tone and strength strong and symmetrical, all extremities  Lymphatic:  No adenopathy  Skin/Hair/Nails:  Skin warm, dry, and intact, no rashes  Neurologic:  Alert and oriented x3, no cranial nerve deficits, normal strength and tone, gait steady  Extremities:  No edema.  Cinthia's sign negative.    Genitourinary: deferred  Pulses:  Equal bilaterally    Assessment Results 9/25/2019   Activities of Daily Living No help needed   Instrumental Activities of Daily Living No help needed   Get Up and Go Score -   Mini Cog Total Score 5   Some recent data might be hidden     A Mini-Cog score of 0-2 suggests the possibility of dementia, score of 3-5 suggests no dementia    Identified Health Risks:     She is at risk for lack of exercise and has been provided with information to increase physical activity for the benefit of her well-being.  The patient was counseled and encouraged to consider modifying their diet and eating habits. She was provided with information on recommended healthy diet options.  The patient reports that she does not have all recommended working emergency equipment available. She was provided with information about emergency preparedness, including smoke detectors.  Information on urinary incontinence and treatment options given to patient.  Information regarding advance directives (living trotter), including where she can download the appropriate form, was provided to the patient via the AVS.

## 2021-06-02 VITALS — HEIGHT: 61 IN | WEIGHT: 234.38 LBS | BODY MASS INDEX: 44.25 KG/M2

## 2021-06-03 VITALS
HEART RATE: 72 BPM | HEIGHT: 60 IN | BODY MASS INDEX: 44.84 KG/M2 | SYSTOLIC BLOOD PRESSURE: 124 MMHG | WEIGHT: 228.4 LBS | DIASTOLIC BLOOD PRESSURE: 70 MMHG

## 2021-06-03 NOTE — TELEPHONE ENCOUNTER
Refill Approved    Rx renewed per Medication Renewal Policy. Medication was last renewed on 6/5/19.    Svetlana Brandon, [facility] Triage/Med Refill 12/1/2019     Requested Prescriptions   Pending Prescriptions Disp Refills     amLODIPine (NORVASC) 5 MG tablet [Pharmacy Med Name: amLODIPine Besylate Oral Tablet 5 MG] 90 tablet 0     Sig: TAKE ONE TABLET BY MOUTH ONE TIME DAILY.       Calcium-Channel Blockers Protocol Passed - 12/1/2019  3:33 PM        Passed - PCP or prescribing provider visit in past 12 months or next 3 months     Last office visit with prescriber/PCP: 2/23/2018 Niraj Biswas MD OR same dept: 12/14/2018 Monisha Griffiths MD OR same specialty: 12/14/2018 Monisha Griffiths MD  Last physical: 9/25/2019 Last MTM visit: Visit date not found   Next visit within 3 mo: Visit date not found  Next physical within 3 mo: Visit date not found  Prescriber OR PCP: Niraj Biswas MD  Last diagnosis associated with med order: 1. Essential hypertension  - amLODIPine (NORVASC) 5 MG tablet [Pharmacy Med Name: amLODIPine Besylate Oral Tablet 5 MG]; TAKE ONE TABLET BY MOUTH ONE TIME DAILY.  Dispense: 90 tablet; Refill: 0    If protocol passes may refill for 12 months if within 3 months of last provider visit (or a total of 15 months).             Passed - Blood pressure filed in past 12 months     BP Readings from Last 1 Encounters:   09/25/19 124/70

## 2021-06-05 VITALS
DIASTOLIC BLOOD PRESSURE: 80 MMHG | HEIGHT: 60 IN | WEIGHT: 231.5 LBS | HEART RATE: 62 BPM | BODY MASS INDEX: 45.45 KG/M2 | SYSTOLIC BLOOD PRESSURE: 130 MMHG

## 2021-06-08 NOTE — PROGRESS NOTES
Assessment:     1. Health care maintenance  Pneumococcal conjugate vaccine 13-valent 6wks-17yrs; >50yrs    Tdap vaccine,  6yo or older,  IM   2. DM2 (diabetes mellitus, type 2)  Glycosylated Hemoglobin A1c    Microalbumin, Random Urine   3. Atypical chest pain         Plan:     1. Health care maintenance  Immunizations will be updated as follows  - Pneumococcal conjugate vaccine 13-valent 6wks-17yrs; >50yrs  - Tdap vaccine,  6yo or older,  IM    2. DM2 (diabetes mellitus, type 2)  Patient had borderline A1c and borderline blood sugars.  6 months ago.  She struggles with her weight going to recheck her A1c and her microalbumin.  She is not on medication as of yet  - Glycosylated Hemoglobin A1c  - Microalbumin, Random Urine    3. Atypical chest pain  Nuclear stress test was negative for ischemia.  Ejection fraction 62%.  No regional wall motion abnormality.  Patient was informed she was hospitalized for atypical chest pain recently      Subjective:   Patient returns for interpretation of nuclear stress test.  Patient was hospitalized for atypical chest discomfort.  Cardiology consultation was obtained.  Nuclear stress test was negative for inducible ischemia showing an ejection fraction of 62%.  Patient is under rate control and blood pressure control with carvedilol 20 mg and amlodipine 5 mg she does struggle with her weight.  She is overweight regarding keep an eye on her A1c and her blood sugar.  She denies any symptoms today.  No headaches, no neck pain, no shortness of breath, dyspnea, chest pain, no abdominal pain.  Past medical history of chronic recurrent pancreatitis, currently asymptomatic.  Patient will continue to lose weight.  Her blood pressures have all been excellent.  She brings in 15 different readings all normal, will continue the same medication.  No changes or questions that were asked for answered.  She'll follow-up with us in 6 months, sooner if her A1c and albumin are abnormal.  I personally  reviewed her family and social history, surgeries, allergies, problem list    Review of Systems: A complete 14 point review of systems was obtained and is negative or as stated in the history of present illness.    Past Medical History:   Diagnosis Date     High cholesterol      Hypertension      Pancreatitis      Family History   Problem Relation Age of Onset     Early death Mother      Cancer Father      No Medical Problems Brother      Breast cancer Other      Past Surgical History:   Procedure Laterality Date     CHOLECYSTECTOMY       JOINT REPLACEMENT       SPINE SURGERY       Social History   Substance Use Topics     Smoking status: Former Smoker     Types: Cigarettes     Quit date: 1997     Smokeless tobacco: Never Used     Alcohol use No      Comment: very rre social drink         Objective:     Visit Vitals     /88     Pulse 66     Ht 5' (1.524 m)     Wt (!) 234 lb (106.1 kg)     SpO2 96%     BMI 45.7 kg/m2       General Appearance:  Alert, cooperative, no distress, overweight body habitus  Head:  Normocephalic, no obvious abnormality  Ears: TM anatomy normal  Eyes:  PERRL, EOM's intact, conjunctiva and corneas clear  Nose:  Nares symmetrical, septum midline, mucosa pink, no sinus tenderness  Throat:  Lips, tongue, and mucosa are moist, pink, and intact  Neck:  Supple, symmetrical, trachea midline, no adenopathy; thyroid: no enlargement, symmetric,no tenderness/mass/nodules; no carotid bruit, no JVD  Back:  Symmetrical, no curvature, ROM normal, no CVA tenderness  Chest/Breast:  No mass or tenderness  Lungs:  Clear to auscultation bilaterally, respirations unlabored   Heart:  Normal PMI, regular rate & rhythm, S1 and S2 normal, no murmurs, rubs, or gallops  Abdomen:  Soft, non-tender, bowel sounds active all four quadrants, no mass, or organomegaly  Musculoskeletal:  Tone and strength strong and symmetrical, all extremities  Lymphatic:  No adenopathy  Skin/Hair/Nails:  Skin warm, dry, and intact, no  rashes  Neurologic:  Alert and oriented x3, no cranial nerve deficits, normal strength and tone, gait steady  Extremities:  No edema.  Cinthia's sign negative.    Genitourinary: deferred  Pulses:  Equal bilaterally     The following high BMI interventions were performed this visit: weight monitoring      This note has been dictated using voice recognition software. Any grammatical or context distortions are unintentional and inherent to the the software.

## 2021-06-12 NOTE — PROGRESS NOTES
Assessment/Plan:     1. Closed left ankle fracture  Referral placed for patient to follow with orthopedics.  Provided paper prescription for knee scooter.  Will refill her chronic prescription of Naprosyn patient declines needing stronger medications.  Work note provided for patient.  Ordered bone density for further evaluation.  - naproxen (NAPROSYN) 500 MG tablet; Take 1 tablet (500 mg total) by mouth 2 (two) times a day with meals.  Dispense: 60 tablet; Refill: 2  - Ambulatory referral to Orthopedics        Subjective:      Natasha Thomas is a 71 y.o. female comes in today to follow-up on ankle fracture.  She was at a reception this weekend when she took a misstep and felt a crack with some pain in her ankle as she fell.  She went to emergency room in [facility] and we were able to review the records via care everywhere.  Reviewed the visit note as well as the x-ray report showing closed nondisplaced fracture of the lateral malleolus.  Patient is in a splint.  She finds it hard to get around her home and is interested in a knee scooter.  Has history of hip replacement so she does not find it easy to use crutches or get around with them.  Asking about perhaps a hard cast.  She has not made follow-up appointment with orthopedic yet.  She has been using her prescription of Naprosyn for pain occasionally will add additional Tylenol.  Is working well does not request stronger medications for pain.  She did have some bruising to her right knee but can bear weight on it and is doing well.  Otherwise no other new or different concerns.  When she fell she did not hit her head lose consciousness no concerns with upper extremities.  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    Current Outpatient Prescriptions   Medication Sig Dispense Refill     albuterol (PROVENTIL HFA;VENTOLIN HFA) 90 mcg/actuation inhaler Inhale 2 puffs every 6 (six)  hours as needed for wheezing or shortness of breath. 2 Inhaler 3     amLODIPine (NORVASC) 5 MG tablet TAKE 1 TABLET (5 MG) BY MOUTH DAILY. 90 tablet 3     aspirin 81 MG EC tablet Take 81 mg by mouth daily.       calcium carbonate-vitamin D2 500 mg(1,250mg) -200 unit tablet Take 1 tablet by mouth 2 (two) times a day.       carvedilol (COREG CR) 20 MG 24 hr capsule TAKE 1 CAPSULE BY MOUTH EVERY MORNING WITH FOOD. 90 capsule 2     carvedilol (COREG) 12.5 MG tablet Take 1 tablet (12.5 mg total) by mouth 2 (two) times a day with meals. 60 tablet 11     CHOLECALCIFEROL, VITAMIN D3, (VITAMIN D3 ORAL) Take 3,000 Int'l Units by mouth.       co-enzyme Q-10 30 mg capsule Take 30 mg by mouth 3 (three) times a day.       CYANOCOBALAMIN, VITAMIN B-12, (VITAMIN B-12 ORAL) Take by mouth.       KRILL OIL ORAL Take by mouth.       MAGNESIUM ORAL Take by mouth.       naproxen (NAPROSYN) 500 MG tablet Take 1 tablet (500 mg total) by mouth 2 (two) times a day with meals. 60 tablet 2     POTASSIUM CHLORIDE ORAL Take by mouth.       prednisoLONE acetate (PRED-FORTE) 1 % ophthalmic suspension   1     TURMERIC ROOT EXTRACT ORAL Take by mouth.       VIGAMOX 0.5 % ophthalmic solution   1     VITAMIN E ACETATE (VITAMIN E ORAL) Take by mouth.       No current facility-administered medications for this visit.      Allergies   Allergen Reactions     Penicillins Hives     Past Medical History, Family History, and Social History reviewed.  Past Medical History:   Diagnosis Date     High cholesterol      Hypertension      Pancreatitis      Past Surgical History:   Procedure Laterality Date     CHOLECYSTECTOMY       JOINT REPLACEMENT       SPINE SURGERY       Penicillins  Family History   Problem Relation Age of Onset     Early death Mother      Cancer Father      No Medical Problems Brother      Breast cancer Other      Social History     Social History     Marital status:      Spouse name: N/A     Number of children: N/A     Years of  education: N/A     Occupational History     Not on file.     Social History Main Topics     Smoking status: Former Smoker     Types: Cigarettes     Quit date: 1997     Smokeless tobacco: Never Used     Alcohol use No      Comment: very rre social drink     Drug use: No     Sexual activity: Not on file     Other Topics Concern     Not on file     Social History Narrative         Review of systems is as stated in HPI, and the remainder of the 10 system review is otherwise negative.    Objective:     Vitals:    08/29/17 1257   BP: 110/86   Pulse: (!) 55   SpO2: 95%    There is no height or weight on file to calculate BMI.  Wt Readings from Last 3 Encounters:   02/06/17 (!) 234 lb (106.1 kg)   01/11/17 (!) 236 lb (107 kg)   12/14/16 (!) 235 lb (106.6 kg)       General Appearance:    Alert, cooperative, no distress, appears stated age, and wheelchair   Head:    Normocephalic, without obvious abnormality, atraumatic   Eyes:    PERRL, no conjunctivitis    Ears:    Normal external ear canals   Nose:   Mucosa normal, no drainage       Throat:   Oropharynx is clear           Lungs:    respirations unlabored        Heart:    Regular rate                    Extremities:  Left ankle in splint, patient able to wiggle toes no significant effusion or concerns with the knees or left ankle.   Pulses:   2+ and symmetric lower extremities   Neuro:    grossly normal sensation and reflexes in extremities    Psych:   grossly normal mood and affect without acute anxiety or psychosis    Skin:  Some bruising on the right lateral knee otherwise no rashes or lesions   :          This note has been dictated using voice recognition software. Any grammatical or context distortions are unintentional and inherent to the software.

## 2021-06-12 NOTE — TELEPHONE ENCOUNTER
RN cannot approve Refill Request    RN can NOT refill this medication Protocol failed and NO refill given. Last office visit: 2/23/2018 Niraj Biswas MD Last Physical: 9/25/2019 Last MTM visit: Visit date not found Last visit same specialty: 12/14/2018 Monisha Griffiths MD.  Next visit within 3 mo: Visit date not found  Next physical within 3 mo: Visit date not found      Em Casey, [facility] Triage/Med Refill 11/5/2020    Requested Prescriptions   Pending Prescriptions Disp Refills     carvediloL (COREG) 12.5 MG tablet [Pharmacy Med Name: Carvedilol Oral Tablet 12.5 MG] 180 tablet 0     Sig: Take 1 tablet by mouth 2 times a day with meals.       Beta-Blockers Refill Protocol Failed - 11/3/2020  2:01 AM        Failed - PCP or prescribing provider visit in past 12 months or next 3 months     Last office visit with prescriber/PCP: 2/23/2018 Niraj Biswas MD OR same dept: Visit date not found OR same specialty: 12/14/2018 Monisha Griffiths MD  Last physical: 9/25/2019 Last MTM visit: Visit date not found   Next visit within 3 mo: Visit date not found  Next physical within 3 mo: Visit date not found  Prescriber OR PCP: Niraj Biswas MD  Last diagnosis associated with med order: 1. Essential hypertension  - carvediloL (COREG) 12.5 MG tablet [Pharmacy Med Name: Carvedilol Oral Tablet 12.5 MG]; Take 1 tablet by mouth 2 times a day with meals.  Dispense: 180 tablet; Refill: 0    If protocol passes may refill for 12 months if within 3 months of last provider visit (or a total of 15 months).             Failed - Blood pressure filed in past 12 months     BP Readings from Last 1 Encounters:   09/25/19 124/70

## 2021-06-13 NOTE — TELEPHONE ENCOUNTER
RN cannot approve Refill Request    RN can NOT refill this medication PCP messaged that patient is overdue for Office Visit. Last office visit: 2/23/2018 Niraj Biswas MD Last Physical: 9/25/2019 Last MTM visit: Visit date not found Last visit same specialty: 12/14/2018 Monisha Griffiths MD.  Next visit within 3 mo: Visit date not found  Next physical within 3 mo: Visit date not found      Vonda Garay, [facility] Triage/Med Refill 12/20/2020    Requested Prescriptions   Pending Prescriptions Disp Refills     amLODIPine (NORVASC) 5 MG tablet [Pharmacy Med Name: amLODIPine Besylate Oral Tablet 5 MG] 90 tablet 0     Sig: TAKE ONE TABLET BY MOUTH ONE TIME DAILY       Calcium-Channel Blockers Protocol Failed - 12/19/2020  2:01 AM        Failed - PCP or prescribing provider visit in past 12 months or next 3 months     Last office visit with prescriber/PCP: 2/23/2018 Niraj Biswas MD OR same dept: Visit date not found OR same specialty: 12/14/2018 Monisha Griffiths MD  Last physical: 9/25/2019 Last MTM visit: Visit date not found   Next visit within 3 mo: Visit date not found  Next physical within 3 mo: Visit date not found  Prescriber OR PCP: Niraj Biswas MD  Last diagnosis associated with med order: 1. Essential hypertension  - amLODIPine (NORVASC) 5 MG tablet [Pharmacy Med Name: amLODIPine Besylate Oral Tablet 5 MG]; TAKE ONE TABLET BY MOUTH ONE TIME DAILY.  Dispense: 90 tablet; Refill: 0    If protocol passes may refill for 12 months if within 3 months of last provider visit (or a total of 15 months).             Failed - Blood pressure filed in past 12 months     BP Readings from Last 1 Encounters:   09/25/19 124/70

## 2021-06-16 NOTE — TELEPHONE ENCOUNTER
RN cannot approve Refill Request    RN can NOT refill this medication PCP messaged that patient is overdue for Office Visit. Last office visit: 2/23/2018 Niraj Biswas MD Last Physical: 9/25/2019 Last MTM visit: Visit date not found Last visit same specialty: 12/14/2018 Monisha Griffiths MD.  Next visit within 3 mo: Visit date not found  Next physical within 3 mo: Visit date not found      Vonda Garay, [facility] Triage/Med Refill 3/22/2021    Requested Prescriptions   Pending Prescriptions Disp Refills     amLODIPine (NORVASC) 5 MG tablet [Pharmacy Med Name: amLODIPine Besylate Oral Tablet 5 MG] 90 tablet 0     Sig: TAKE ONE TABLET BY MOUTH ONE TIME DAILY       Calcium-Channel Blockers Protocol Failed - 3/22/2021  2:31 PM        Failed - PCP or prescribing provider visit in past 12 months or next 3 months     Last office visit with prescriber/PCP: 2/23/2018 Niraj Biswas MD OR same dept: Visit date not found OR same specialty: 12/14/2018 Monisha Griffiths MD  Last physical: 9/25/2019 Last MTM visit: Visit date not found   Next visit within 3 mo: Visit date not found  Next physical within 3 mo: Visit date not found  Prescriber OR PCP: Niraj Biswas MD  Last diagnosis associated with med order: 1. Essential hypertension  - amLODIPine (NORVASC) 5 MG tablet [Pharmacy Med Name: amLODIPine Besylate Oral Tablet 5 MG]; TAKE ONE TABLET BY MOUTH ONE TIME DAILY   Dispense: 90 tablet; Refill: 0    If protocol passes may refill for 12 months if within 3 months of last provider visit (or a total of 15 months).             Failed - Blood pressure filed in past 12 months     BP Readings from Last 1 Encounters:   09/25/19 124/70

## 2021-06-16 NOTE — PROGRESS NOTES
CC: I am here for my checkup; my stomach is bothering me; my hiatal hernia is acting up    HPI: Natasha has been under my care for decades along with her  and family.  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  Past medical history has been that of idiopathic chronic pancreatitis she does see gastroenterology she is remained asymptomatic she has had a few flares over the years.  Now she is having a flare of her hiatal hernia which acts up sometimes after meals.  We have decided her today to place her on famotidine 20 mg twice daily as needed for acid reflux.  Her weight remains stable she is active she enjoys cooking it keeps her mind occupied.  She and her  have survived the COVID-19 exposure without problems.  Both of them have been vaccinated.  She is due for a mammogram and she is going to make that appointment.  Her blood sugar has been borderline we will recheck all other her parameters here today.  She denies any shortness of breath or dyspnea chest pain no abdominal pain diarrhea constipation urgency frequency or dysuria she does have some varicose veins that act up every now and then.  I have done a chart review its been a year since we have seen her I will renew her medications as necessary and institute new therapy here with famotidine.  Is a pleasure to see her again      Review of Systems: A complete 14 point review of systems was obtained and is negative or as stated in the history of present illness.    Past Medical History:   Diagnosis Date     High cholesterol      Hypertension      Pancreatitis      Family History   Problem Relation Age of Onset     Early death Mother      Cancer Father      No Medical Problems Brother      Breast cancer Other      Past Surgical History:   Procedure Laterality Date     CHOLECYSTECTOMY       JOINT REPLACEMENT       SPINE SURGERY       Social History     Tobacco Use     Smoking status: Former Smoker      Types: Cigarettes     Quit date:      Years since quittin.2     Smokeless tobacco: Never Used   Substance Use Topics     Alcohol use: No     Comment: very rre social drink     Drug use: No       PE:   /80   Pulse 62   Ht 5' (1.524 m)   Wt (!) 231 lb 8 oz (105 kg)   BMI 45.21 kg/m       General Appearance:  Alert, cooperative, no distress  Head:  Normocephalic, no obvious abnormality  Ears: TM anatomy normal  Eyes:  PERRL, EOM's intact, conjunctiva and corneas clear  Nose:  Nares symmetrical, septum midline, mucosa pink, no sinus tenderness  Throat:  Lips, tongue, and mucosa are moist, pink, and intact  Neck:  Supple, symmetrical, trachea midline, no adenopathy; thyroid: no enlargement, symmetric,no tenderness/mass/nodules; no carotid bruit, no JVD  Back:  Symmetrical, no curvature, ROM normal, no CVA tenderness  Chest/Breast:  No mass or tenderness  Lungs:  Clear to auscultation bilaterally, respirations unlabored   Heart:  Normal PMI, regular rate & rhythm, S1 and S2 normal, no murmurs, rubs, or gallops  Abdomen:  Soft, non-tender, bowel sounds active all four quadrants, no mass, or organomegaly  Musculoskeletal:  Tone and strength strong and symmetrical, all extremities  Lymphatic:  No adenopathy  Skin/Hair/Nails:  Skin warm, dry, and intact, no rashes  Neurologic:  Alert and oriented x3, no cranial nerve deficits, normal strength and tone, gait steady  Extremities:  No edema.  Cinthia's sign negative.    Genitourinary: deferred  Pulses:  Equal bilaterally    Impression:     1. Medicare annual wellness visit, subsequent  Comprehensive Metabolic Panel    HM2(CBC w/o Differential)   2. Benign Essential Hypertension  Electrocardiogram Perform - Clinic    Urinalysis-UC if Indicated    Lipid Cascade   3. Idiopathic chronic pancreatitis (H)     4. Diverticulosis of colon     5. Gastroesophageal reflux disease with esophagitis, unspecified whether hemorrhage  famotidine (PEPCID) 20 MG tablet        PLAN:    1. Medicare annual wellness visit, subsequent  Work-up to include the following  - Comprehensive Metabolic Panel  - [facility](CBC w/o Differential)    2. Benign Essential Hypertension  EKG is pending  - Electrocardiogram Perform - Clinic  - Urinalysis-UC if Indicated  - Lipid Cascade    3. Idiopathic chronic pancreatitis (H)  Follow-up with gastroenterology annually    4. Diverticulosis of colon  Colonoscopy is up-to-date    5. Gastroesophageal reflux disease with esophagitis, unspecified whether hemorrhage  - famotidine (PEPCID) 20 MG tablet; Take 1 tablet (20 mg total) by mouth 2 (two) times a day.  Dispense: 60 tablet; Refill: 1      Advance care directive in place        This note has been dictated using voice recognition software. Any grammatical or context distortions are unintentional and inherent to the the software.

## 2021-06-16 NOTE — TELEPHONE ENCOUNTER
30 day supply pended for approval    Scheduling: Please contact patient to arrange an appointment for additional refills.

## 2021-06-16 NOTE — TELEPHONE ENCOUNTER
Central PA team  [phone]  Pool: HE PA MED ([id number])          PA has been initiated.       PA form completed and faxed insurance via Cover My Meds     Key:  [id number]     Medication:    Famotidine 20MG tablets    Insurance:  [employer] [place]        Response will be received via fax and may take up to 5-10 business days depending on plan

## 2021-06-17 NOTE — TELEPHONE ENCOUNTER
Telephone Encounter by Guillermina Mendoza at 4/6/2021 11:03 AM     Author: Guillermina Mendoza Service: -- Author Type: --    Filed: 4/6/2021 11:04 AM Encounter Date: 4/1/2021 Status: Signed    : Guillermina Mendoza       PRIOR AUTHORIZATION DENIED    Denial Rational: Patient needs to try and fail famotidine 40 mg tablets            Appeal Information: If provider would like to appeal please provide a letter of medical necessity stating why formulary alternatives would not be clinically appropriate for the patient and route back to the PA team.

## 2021-06-17 NOTE — TELEPHONE ENCOUNTER
Refill Approved    Rx renewed per Medication Renewal Policy. Medication was last renewed on 3/24/21.    Humberto Mari, [facility] Triage/Med Refill 4/27/2021     Requested Prescriptions   Pending Prescriptions Disp Refills     amLODIPine (NORVASC) 5 MG tablet [Pharmacy Med Name: amLODIPine Besylate Oral Tablet 5 MG] 30 tablet 0     Sig: Take 1 tablet (5 mg total) by mouth daily       Calcium-Channel Blockers Protocol Passed - 4/26/2021  7:08 PM        Passed - PCP or prescribing provider visit in past 12 months or next 3 months     Last office visit with prescriber/PCP: 2/23/2018 Niraj Biswas MD OR same dept: Visit date not found OR same specialty: 12/14/2018 Monisha Griffiths MD  Last physical: 3/31/2021 Last MTM visit: Visit date not found   Next visit within 3 mo: Visit date not found  Next physical within 3 mo: Visit date not found  Prescriber OR PCP: Niraj Biswas MD  Last diagnosis associated with med order: 1. Essential hypertension  - amLODIPine (NORVASC) 5 MG tablet [Pharmacy Med Name: amLODIPine Besylate Oral Tablet 5 MG]; Take 1 tablet (5 mg total) by mouth daily.  Dispense: 30 tablet; Refill: 0    If protocol passes may refill for 12 months if within 3 months of last provider visit (or a total of 15 months).             Passed - Blood pressure filed in past 12 months     BP Readings from Last 1 Encounters:   03/31/21 130/80

## 2021-06-17 NOTE — PATIENT INSTRUCTIONS - HE
Patient Instructions by Niraj Biswas MD at 9/25/2019  7:40 AM     Author: Niraj Biswas MD Service: -- Author Type: Physician    Filed: 9/25/2019  7:59 AM Encounter Date: 9/25/2019 Status: Signed    : Niraj Biswas MD (Physician)         Patient Education     Exercise for a Healthier Heart  You may wonder how you can improve the health of your heart. If youre thinking about exercise, youre on the right track. You dont need to become an athlete, but you do need a certain amount of brisk exercise to help strengthen your heart. If you have been diagnosed with a heart condition, your doctor may recommend exercise to help stabilize your condition. To help make exercise a habit, choose safe, fun activities.       Be sure to check with your health care provider before starting an exercise program.    Why exercise?  Exercising regularly offers many healthy rewards. It can help you do all of the following:    Improve your blood cholesterol levels to help prevent further heart trouble    Lower your blood pressure to help prevent a stroke or heart attack    Control diabetes, or reduce your risk of getting this disease    Improve your heart and lung function    Reach and maintain a healthy weight    Make your muscles stronger and more limber so you can stay active    Prevent falls and fractures by slowing the loss of bone mass (osteoporosis)    Manage stress better  Exercise tips  Ease into your routine. Set small goals. Then build on them.  Exercise on most days. Aim for a total of 150 or more minutes of moderate to  vigorous intensity activity each week. Consider 40 minutes, 3 to 4 times a week. For best results, activity should last for 40 minutes on average. It is OK to work up to the 40 minute period over time. Examples of moderate-intensity activity is walking one mile in 15 minutes or 30 to 45 minutes of yard work.  Step up your daily activity level. Along with your exercise program, try being more active  throughout the day. Walk instead of drive. Do more household tasks or yard work.  Choose one or more activities you enjoy. Walking is one of the easiest things you can do. You can also try swimming, riding a bike, or taking an exercise class.  Stop exercising and call your doctor if you:    Have chest pain or feel dizzy or lightheaded    Feel burning, tightness, pressure, or heaviness in your chest, neck, shoulders, back, or arms    Have unusual shortness of breath    Have increased joint or muscle pain    Have palpitations or an irregular heartbeat      9664-9121 [employer]. [address] [place] [address]. All rights reserved. This information is not intended as a substitute for professional medical care. Always follow your healthcare professional's instructions.         Patient Education   Understanding [employer] MyPlate  The USDA (US Department of Agriculture) has guidelines to help you make healthy food choices. These are called MyPlate. MyPlate shows the food groups that make up healthy meals using the image of a place setting. Before you eat, think about the healthiest choices for what to put onto your plate or into your cup or bowl. To learn more about building a healthy plate, visit www.choosemyplate.gov.       The Food Groups    Fruits: Any fruit or 100% fruit juice counts as part of the Fruit Group. Fruits may be fresh, canned, frozen, or dried, and may be whole, cut-up, or pureed. Make half your plate fruits and vegetables.    Vegetables: Any vegetable or 100% vegetable juice counts as a member of the Vegetable Group. Vegetables may be fresh, frozen, canned, or dried. They can be served raw or cooked and may be whole, cut-up, or mashed. Make half your plate fruits and vegetables.     Grains: All foods made from grains are part of the Grains Group. These include wheat, rice, oats, cornmeal, and barley such as bread, pasta, oatmeal, cereal, tortillas, and grits. Grains should be no more  than a quarter of your plate. At least half of your grains should be whole grains.    Protein: This group includes meat, poultry, seafood, beans and peas, eggs, processed soy products (like tofu), nuts (including nut butters), and seeds. Make protein choices no more than a quarter of your plate. Meat and poultry choices should be lean or low fat.    Dairy: All fluid milk products and foods made from milk that contain calcium, like yogurt and cheese are part of the Dairy Group. (Foods that have little calcium, such as cream, butter, and cream cheese, are not part of the group.) Most dairy choices should be low-fat or fat-free.    Oils: These are fats that are liquid at room temperature. They include canola, corn, olive, soybean, and sunflower oil. Foods that are mainly oil include mayonnaise, certain salad dressings, and soft margarines. You should have only 5 to 7 teaspoons of oils a day. You probably already get this much from the food you eat.  Use [employer] to Help Build Your Meals  The SuperTracker can help you plan and track your meals and activity. You can look up individual foods to see or compare their nutritional value. You can get guidelines for what and how much you should eat. You can compare your food choices. And you can assess personal physical activities and see ways you can improve. Go to www.[place].gov/supertracker/.    0928-2577 [employer]. [address] [place], [place] [id number]. All rights reserved. This information is not intended as a substitute for professional medical care. Always follow your healthcare professional's instructions.           Patient Education    Home Fire Safety  Each year, thousands of people, including children, are injured and killed in home fires. Children are often curious about fire, and may not understand the dangers. This makes home fire safety practices especially important. Three important things you can do to keep your home safe from  fire are:    Install smoke alarms in your home and make sure they work properly.    Teach children not to play with matches, lighters, and other materials that can be used to start fires. And keep these materials out of childrens reach.    Teach children what to do in case of fire. Create a fire safety action plan and practice it.  Read on for more details about keeping your family and home safe from fire.        Being Prepared for a Fire  A home fire can happen at any time. The following can help you be prepared:    Install smoke alarms on every level of your home, including the basement and outside all sleeping areas. This simple step cuts your familys risk of dying in a fire nearly in half.    Test smoke alarms monthly, and change the batteries once a year or when the alarm chirps.    Dont disable smoke alarms, even for a short time.    Ask your local fire department for tips on where to place smoke alarms in your home.    Replace all smoke alarms every 10 years.    Consider using voice smoke alarms. These alarms allow you to substitute your own voice for the alarm sound. They are helpful because many children dont wake up to the sound of a regular smoke alarm.    Install carbon monoxide detectors near sleeping areas.    Be aware that carbon monoxide is a byproduct of smoke that can be deadly. Its a gas that you cant see, smell, or taste.    Consider buying a combination smoke alarm / carbon monoxide detector.    Keep fire extinguishers in the home.    Keep them in accessible locations, especially in the kitchen.    Check usage dates to make sure they are not .    Use fire extinguishers only when the fire is in a contained area and is not spreading. (Otherwise, you should focus on getting out of the home.)    Train adults to use fire extinguishers. (Children should focus on getting out of the home during a fire.)    If you live in an apartment, talk to your landlord about where smoke alarms are and how  often they are tested. Also ask about fire extinguisher locations and emergency exit routes.  Indoor Fire Safety  Many things in your home are potential fire hazards. Follow these steps to help keep your home safe.    Be careful in the kitchen.    Never leave food thats cooking unattended.    If a fire breaks out in a cooking pan, put a lid on it to smother it. And never throw water on a grease fire. It will make the fire worse.    Conduct a home safety inspection. Look for anything, such as frayed wires and cords, that can cause a fire. Fix or remove any fire safety hazards you find.    Keep all matches and lighters in a secured drawer or cabinet out of the reach of children. Use childproof lighters.    Check to make sure all appliances, including the stove, are turned off before leaving the home.    Know where the gas main shut-off is located.    Make sure space heaters are stable and have protective covers. Keep them at least 3 feet from anything that can burn, such as curtains. Dont use space heaters in areas where young kids spend time alone.    Keep flammable liquids such as kerosene and gasoline locked up and safely stored away from kids and heat.    Keep all smoking materials out of reach of children. And never smoke in bed. If possible, smoke outdoors only.  Outdoor Fire Safety  Fire can be a hazard outdoors as well as indoors. When outdoors, be sure to do the following:    Always supervise kids near a barbecue grill, campfire, or portable stove.    Dont use fire pits around children. Kids can fall into them, and pits can be hot even after the fire goes out.    Keep a garden hose or fire extinguisher handy when cooking outdoors in case of fire.  Teaching Your Child About Fire Safety  One of the best ways to keep your home safe from fire is education. Make sure everyone in your family knows fire safety rules, including children.    Teach your children the dangers of matches, lighters, and other dangerous  items.    Teach them to never touch these and other objects that are hot, such as candles.    Have them tell you right away whenever they find matches or lighters. Explain that these items are tools for grown-ups, not toys. And never amuse children with matches or lighters.    Round up all matches and lighters and store them safely. In case you missed some, ask your children to tell you where any are located throughout your home.    Never leave a child alone in a room with a lit candle. Dont allow teens to have candles in their rooms.    Show children what to do in case of fire.    Be sure your kids know what the fire alarm sounds like and what to do if it goes off.    Teach kids what to do if their clothes catch fire: Stop, Drop to the ground, and Roll until the fire is put out. They should also cover their face with their hands. Practice these steps with your children. Make sure they understand that running will make the fire burn faster.    Show children how to crawl below smoke during a fire.    Make sure kids know at least two escape routes from each room in the home. These escape routes can be windows.    Teach kids to test doors for nearby fire by feeling for heat with the back of their hand. If the door is warm or hot, they should try their second exit.    Explain to children that they cant hide from a fire. Hiding in a closet or under a bed wont make them safe. Instead, they should try to escape the home. And if they cant escape, they should let others know they are trapped. They can do this by shutting the door to the room, opening a window, and turning on the lights.    Talk to your local fire department.    Introduce your children to a . Let them know that firefighters will look different when in full protective gear. Tell them to never hide from firefighters, and to follow all directions from firefighters during a fire.    Find out if the fire department has a fire safety program for  kids.      Create a Fire Safety Plan  Create a plan for your family to follow in case of a fire. Try making it a family project. Important steps for the plan include leaving the home right away and having a designated meeting place.    Make sure your child understands to get out and stay out. He or she should get out of the home immediately and not go back in, even if family members or pets are still inside.    Decide on a safe meeting place away from the home for everyone to gather.    Teach children to call 911 or emergency services from a cell phone or neighbors phone. Make sure they know to do this only after they are safely out of the home.    Teach your children the fire safety plan. Practice it and make sure they understand it.    Have fire drills twice a year to keep your children prepared in case of fire.    Visit the National Fire Protection Association web site at www.nfpa.org for more information.      8497-1608 [employer]. [address] [place] [address]. All rights reserved. This information is not intended as a substitute for professional medical care. Always follow your healthcare professional's instructions.         Patient Education   Urinary Incontinence, Female (Adult)  Urinary incontinence means loss of control of the bladder. This problem affects many women, especially as they get older. If you have incontinence, you may be embarrassed to ask for help. But know that this problem can be treated.  Types of Incontinence  There are different types of incontinence. Two of the main types are described here. You can have more than one type.    Stress incontinence. With this type, urine leaks when pressure (stress) is put on the bladder. This may happen when you cough, sneeze, or laugh. Stress incontinence most often occurs because the pelvic floor muscles that support the bladder and urethra are weak. This can happen after pregnancy and vaginal childbirth or a hysterectomy. It  can also be due to excess body weight or hormone changes.    Urge incontinence (also called overactive bladder). With this type, a sudden urge to urinate is felt often. This may happen even though there may not be much urine in the bladder. The need to urinate often during the night is common. Urge incontinence most often occurs because of bladder spasms. This may be due to bladder irritation or infection. Damage to bladder nerves or pelvic muscles, constipation, and certain medicines can also lead to urge incontinence.  Treatment of urinary incontinence depends on the cause. Further evaluation is needed to find the type you have. This will likely include an exam and certain tests. Based on the results, you and your healthcare provider can then plan treatment. Until a diagnosis is made, the home care tips below can help relieve symptoms.  Home care    Do pelvic floor muscle exercises, if they are prescribed. The pelvic floor muscles help support the bladder and urethra. Many women find that their symptoms improve when doing special exercises that strengthen these muscles. To do the exercises contract the muscles you would use to stop your stream of urine, but do this when youre not urinating. Hold for 10 seconds, then relax. Repeat 10 to 20 times in a row, at least 3 times a day. Your provider may give you other instructions for how to do the exercises and how often.    Keep a bladder diary. This helps track how often and how much you urinate over a set period of time. Bring this diary with you to your next visit with the provider. The information can help your provider learn more about your bladder problem.    Lose weight, if advised to by your provider. Excess weight puts pressure on the bladder. Your provider can help you create a weight-loss plan thats right for you. This may include exercising more and making certain diet changes.    Don't consume foods and drinks that may irritate the bladder. These can  include alcohol and caffeinated drinks.    Quit smoking. Smoking and other tobacco use can lead to chronic cough that strains the pelvic floor muscles. Smoking may also damage the bladder and urethra. Talk with your provider about treatments or methods you can use to quit smoking.    If drinking large amounts of fluid causes you to have symptoms, you may be advised to limit your fluid intake. You may also be advised to drink most of your fluids during the day and to limit fluids at night.    If youre worried about urine leakage or accidents, you may wear absorbent pads to catch urine. Change the pads often. This helps reduce discomfort. It may also reduce the risk of skin or bladder infections.  Follow-up care  Follow up with your healthcare provider, or as directed. It may take some to find the right treatment for your problem. Your treatment plan may include special therapies or medicines. Certain procedures or surgery may also be options. Be sure to discuss any questions you have with your provider.  When to seek medical advice  Call the healthcare provider right away if any of these occur:    Fever of 100.4 F (38 C) or higher, or as directed by your provider    Bladder pain or fullness    Abdominal swelling    Nausea or vomiting    Back pain    Weakness, dizziness or fainting  Date Last Reviewed: 10/1/2017    9275-7683 [employer]. [address] [place] [address], [place] [address]. All rights reserved. This information is not intended as a substitute for professional medical care. Always follow your healthcare professional's instructions.     Patient Education   Understanding Advance Care Planning  Advance care planning is the process of deciding ones own future medical care. It helps ensure that if you cant speak for yourself, your wishes can still be carried out. The plan is a series of legal documents that note a persons wishes. The documents vary by state. Advance care planning may be done when a  person has a serious illness that is expected to get worse. It may be done before major surgery. And it can help you and your family be prepared in case of a major illness or injury. Advance care planning helps with making decisions at these times.       A health care proxy is a person who acts as the voice of a patient when the patient cant speak for himself or herself. The name of this role varies by state. It may be called a Durable Medical Power of  or Durable Power of  for Healthcare. It may be called an agent, surrogate, or advocate. Or it may be called a representative or decision maker. It is an official duty that is identified by a legal document. The document also varies by state.    Why Is Advance Care Planning Important?  If a person communicates their healthcare wishes:    They will be given medical care that matches their values and goals.    Their family members will not be forced to make decisions in a crisis with no guidance.  Creating a Plan  Making an advance care plan is often done in 3 steps:    Thinking about ones wishes. To create an advance care plan, you should think about what kind of medical treatment you would want if you lose the ability to communicate. Are there any situations in which you would refuse or stop treatment? Are there therapies you would want or not want? And whom do you want to make decisions for you? There are many places to learn more about how to plan for your care. Ask your doctor or  for resources.    Picking a health care proxy. This means choosing a trusted person to speak for you only when you cant speak for yourself. When you cannot make medical decisions, your proxy makes sure the instructions in your advance care plan are followed. A proxy does not make decisions based on his or her own opinions. They must put aside those opinions and values if needed, and carry out your wishes.    Filling out the legal documents. There are several  kinds of legal documents for advance care planning. Each one tells health care providers your wishes. The documents may vary by state. They must be signed and may need to be witnessed or notarized. You can cancel or change them whenever you wish. Depending on your state, the documents may include a Healthcare Proxy form, Living Will, Durable Medical Power of , Advance Directive, or others.  The Familys Role  The best help a family can give is to support their loved ones wishes. Open and honest communication is vital. Family should express any concerns they have about the patients choices while the patient can still make decisions.    9491-4126 [employer]. [address] [place] [address]. All rights reserved. This information is not intended as a substitute for professional medical care. Always follow your healthcare professional's instructions.         Also, [facility] [employer] [place] offers a free, downloadable health care directive that allows you to share your treatment choices and personal preferences if you cannot communicate your wishes. It also allows you to appoint another person (called a health care agent) to make health care decisions if you are unable to do so. You can download an advance directive by going here: [email or web address]     Patient Education   Personalized Prevention Plan  You are due for the preventive services outlined below.  Your care team is available to assist you in scheduling these services.  If you have already completed any of these items, please share that information with your care team to update in your medical record.  Health Maintenance   Topic Date Due   ? HEPATITIS C SCREENING  1945   ? ZOSTER VACCINES (1 of 2) 10/13/1995   ? MEDICARE ANNUAL WELLNESS VISIT  10/13/2010   ? PNEUMOCOCCAL IMMUNIZATION 65+ LOW/MEDIUM RISK (1 of 2 - PCV13) 10/13/2010   ? MAMMOGRAM  06/21/2018   ? DXA SCAN  08/31/2019   ? FALL RISK  ASSESSMENT  09/25/2020   ? ADVANCE CARE PLANNING  09/19/2023   ? TD 18+ HE  02/06/2027   ? COLONOSCOPY  10/23/2027   ? INFLUENZA VACCINE RULE BASED  Completed

## 2021-06-20 NOTE — PROGRESS NOTES
Assessment and Plan:   Patient presents for annual wellness examination.  Reports she has been feeling well.  We did treat her for benign essential hypertension.  This is well-controlled with amlodipine 5 mg daily and with Coreg 12.5 mg twice daily.  She is over 7 years of age and takes a baby aspirin.  She has exercise-induced asthma and uses as needed albuterol with good relief.  We will refill all medications today.  Being overweight is been a problem for her she continues to try to work on that.  She has a past medical history of pancreatitis which fortunately has been quiet for about 5 years now.  Previously was under the care of gastroenterology.  She denies any visual disturbances hearing loss dysphagia shortness of breath dyspnea no chest pain no abdominal pain no bowel changes no neurological complaints.  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  Her  lives at home with her and made to continue to maintain the residents.  She does not drink alcohol smoke about 30 years ago.  I have done a chart review today medication review and answered all of her questions to the best of her ability.  Follow-up    1. Medicare annual wellness visit, subsequent  Workup to include  - Comprehensive Metabolic Panel  - Electrocardiogram Perform - Clinic  - Lipid Cascade  - Urinalysis-UC if Indicated    2. Benign Essential Hypertension  Following will be ordered  - Comprehensive Metabolic Panel  - Electrocardiogram Perform - Clinic  - Lipid [place]  - Thyroid [place]    3. Idiopathic chronic pancreatitis (H)    - Comprehensive Metabolic Panel    4. Visit for screening mammogram  This is been future ordered  - Mammo Screening Bilateral; Future    5. Asthma exacerbation    - albuterol (PROAIR HFA;PROVENTIL HFA;VENTOLIN HFA) 90 mcg/actuation inhaler; Inhale 2 puffs every 6 (six) hours as needed for wheezing or shortness of breath.  Dispense: 2 Inhaler; Refill: 3      The patient's current medical problems were reviewed.    I have had an Advance Directives discussion with the patient.  The following health maintenance schedule was reviewed with the patient and provided in printed form in the after visit summary:   Health Maintenance   Topic Date Due     ZOSTER VACCINE  10/13/2005     PNEUMOCOCCAL POLYSACCHARIDE VACCINE AGE 65 AND OVER  10/13/2010     DIABETES OPHTHALMOLOGY EXAM  06/01/2017     DIABETES HEMOGLOBIN A1C  08/06/2017     DIABETES FOLLOW-UP  08/06/2017     DIABETES FOOT EXAM  02/06/2018     DIABETES URINE MICROALBUMIN  02/06/2018     MAMMOGRAM  06/21/2018     INFLUENZA VACCINE RULE BASED (1) 08/01/2018     FALL RISK ASSESSMENT  02/23/2019     DXA SCAN  08/31/2019     ADVANCE DIRECTIVES DISCUSSED WITH PATIENT  01/26/2020     TD 18+ HE  02/06/2027     COLONOSCOPY  10/23/2027     PNEUMOCOCCAL CONJUGATE VACCINE FOR ADULTS (PCV13 OR PREVNAR)  Completed        Subjective:   Chief Complaint: Natasha Thomas is an 72 y.o. female here for an Annual Wellness visit.   HPI: See under plan on assessment    Review of Systems: 14 point review of systems negative please see above.  The rest of the review of systems are negative for all systems.    Patient Care Team:  Niraj Biswas MD as PCP - General     Patient Active Problem List   Diagnosis     Hypothyroidism     DM2 (diabetes mellitus, type 2) (H)     Overweight     Benign Essential Hypertension     External Hemorrhoids     Chronic Recurrent Pancreatitis     Essential Hematuria     Past Medical History:   Diagnosis Date     High cholesterol      Hypertension      Pancreatitis       Past Surgical History:   Procedure Laterality Date     CHOLECYSTECTOMY       JOINT REPLACEMENT       SPINE SURGERY        Family History   Problem Relation Age of Onset     Early death Mother      Cancer Father      No Medical Problems Brother      Breast cancer Other       Social History     Social History     Marital status:      Spouse  "name: N/A     Number of children: N/A     Years of education: N/A     Occupational History     Not on file.     Social History Main Topics     Smoking status: Former Smoker     Types: Cigarettes     Quit date: 1997     Smokeless tobacco: Never Used     Alcohol use No      Comment: very rre social drink     Drug use: No     Sexual activity: Not on file     Other Topics Concern     Not on file     Social History Narrative      Current Outpatient Prescriptions   Medication Sig Dispense Refill     amLODIPine (NORVASC) 5 MG tablet TAKE ONE TABLET BY MOUTH ONE TIME DAILY  90 tablet 1     aspirin 81 MG EC tablet Take 81 mg by mouth daily.       calcium carbonate-vitamin D2 500 mg(1,250mg) -200 unit tablet Take 1 tablet by mouth 2 (two) times a day.       carvedilol (COREG) 12.5 MG tablet Take 1 tablet (12.5 mg total) by mouth 2 (two) times a day with meals. 60 tablet 10     CHOLECALCIFEROL, VITAMIN D3, (VITAMIN D3 ORAL) Take 3,000 Int'l Units by mouth.       co-enzyme Q-10 30 mg capsule Take 30 mg by mouth 3 (three) times a day.       CYANOCOBALAMIN, VITAMIN B-12, (VITAMIN B-12 ORAL) Take by mouth.       KRILL OIL ORAL Take by mouth.       MAGNESIUM ORAL Take by mouth.       mesalamine (LIALDA) 1.2 gram EC tablet Take 1.2 g by mouth daily.  5     POTASSIUM CHLORIDE ORAL Take by mouth.       TURMERIC ROOT EXTRACT ORAL Take by mouth.       VITAMIN E ACETATE (VITAMIN E ORAL) Take by mouth.       albuterol (PROVENTIL HFA;VENTOLIN HFA) 90 mcg/actuation inhaler Inhale 2 puffs every 6 (six) hours as needed for wheezing or shortness of breath. 2 Inhaler 3     prednisoLONE acetate (PRED-FORTE) 1 % ophthalmic suspension   1     VIGAMOX 0.5 % ophthalmic solution   1     No current facility-administered medications for this visit.       Objective:   Vital Signs:   Visit Vitals     /62 (Patient Site: Left Arm, Patient Position: Sitting, Cuff Size: Adult Large)     Pulse (!) 51     Ht 5' 1\" (1.549 m)     Wt (!) 235 lb 8 oz (106.8 " kg)     SpO2 96%     BMI 44.5 kg/m2        VisionScreening:  No exam data present     PHYSICAL EXAM  General Appearance:  Alert, cooperative, no distress;overweight body habitus  Head:  Normocephalic, no obvious abnormality  Ears: TM anatomy normal  Eyes:  PERRL, EOM's intact, conjunctiva and corneas clear  Nose:  Nares symmetrical, septum midline, mucosa pink, no sinus tenderness  Throat:  Lips, tongue, and mucosa are moist, pink, and intact  Neck:  Supple, symmetrical, trachea midline, no adenopathy; thyroid: no enlargement, symmetric,no tenderness/mass/nodules; no carotid bruit, no JVD  Back:  Symmetrical, no curvature, ROM normal, no CVA tenderness  Chest/Breast:  No mass or tenderness  Lungs:  Clear to auscultation bilaterally, respirations unlabored   Heart:  Normal PMI, regular rate & rhythm, S1 and S2 normal, no murmurs, rubs, or gallops  Abdomen:  Soft, non-tender, bowel sounds active all four quadrants, no mass, or organomegaly  Musculoskeletal:  Tone and strength strong and symmetrical, all extremities  Lymphatic:  No adenopathy  Skin/Hair/Nails:  Skin warm, dry, and intact, no rashes  Neurologic:  Alert and oriented x3, no cranial nerve deficits, normal strength and tone, gait steady  Extremities:  No edema.  Cinthia's sign negative. Bilateral varicosities   Genitourinary: deferred  Pulses:  Equal bilaterally    Assessment Results 9/19/2018   Activities of Daily Living No help needed   Instrumental Activities of Daily Living No help needed   Get Up and Go Score Less than 12 seconds   Mini Cog Total Score 4   Some recent data might be hidden     A Mini-Cog score of 0-2 suggests the possibility of dementia, score of 3-5 suggests no dementia    Identified Health Risks:     She is at risk for lack of exercise and has been provided with information to increase physical activity for the benefit of her well-being.  The patient was counseled and encouraged to consider modifying their diet and eating habits. She  was provided with information on recommended healthy diet options.  The patient reports that she does not have all recommended working emergency equipment available. She was provided with information about emergency preparedness, including smoke detectors.  Information regarding advance directives (living trotter), including where she can download the appropriate form, was provided to the patient via the AVS.

## 2021-06-20 NOTE — PROGRESS NOTES
HPI: This patient is a 71yo F who presents back to clinic for evaluation of epistaxis. She was cauterized on the left back in March and had been doing very well until recently. States that it has been going for about a week or so from the right. They start and stop spontaneously, and one of them have required an ER visit or packing. She is currently using nasal saline     Past medical history, surgical history, social history, family history, medications, and allergies have been reviewed with the patient and are documented above.     Review of Systems: a 10-system review was performed. Pertinent positives are noted in the HPI and on a separate scanned document in the chart.     PHYSICAL EXAMINATION:  GEN: no acute distress, normocephalic. Obese.   EYES: extraocular movements are intact, pupils are equal and round. Sclera clear.   NOSE: anterior nares are patent. There are no masses or lesions. The septum is non-obstructing, and overall healthy appearing with a prominent vessel on the anterior right that was cauterized today without issue.   NECK: soft and supple. No lymphadenopathy or masses. Airway is midline.  NEURO: CN II-XII are intact bilaterally. alert and oriented.   PULM: breathing comfortably on room air, normal chest expansion with respiration  HEART: no cyanosis, no clubbing     MEDICAL DECISION-MAKING: This patient is a 71yo F with epistaxis from the right anterior septum.  Advised on continued nasal saline use and aquaphor. RTC PRN.

## 2021-06-21 NOTE — LETTER
Letter by Niraj Biswas MD at      Author: Niraj Biswas MD Service: -- Author Type: --    Filed:  Encounter Date: 4/1/2021 Status: (Other)         Natasha Thomas  [address] [facility] [address]             April 1, 2021         Dear Ms. Thomas,    Below are the results from your recent visit:    Resulted Orders   Comprehensive Metabolic Panel   Result Value Ref Range    Sodium 140 136 - 145 mmol/L    Potassium 4.1 3.5 - 5.0 mmol/L    Chloride 103 98 - 107 mmol/L    CO2 29 22 - 31 mmol/L    Anion Gap, Calculation 8 5 - 18 mmol/L    Glucose 110 70 - 125 mg/dL    BUN 13 8 - 28 mg/dL    Creatinine 0.72 0.60 - 1.10 mg/dL    GFR MDRD Af Amer >60 >60 mL/min/1.73m2    GFR MDRD Non Af Amer >60 >60 mL/min/1.73m2    Bilirubin, Total 0.8 0.0 - 1.0 mg/dL    Calcium 10.0 8.5 - 10.5 mg/dL    Protein, Total 7.2 6.0 - 8.0 g/dL    Albumin 3.8 3.5 - 5.0 g/dL    Alkaline Phosphatase 110 45 - 120 U/L    AST 45 (H) 0 - 40 U/L    ALT 20 0 - 45 U/L    Narrative    Fasting Glucose reference range is 70-99 mg/dL per  American Diabetes Association (ADA) guidelines.   HM2(CBC w/o Differential)   Result Value Ref Range    WBC 7.6 4.0 - 11.0 thou/uL    RBC 5.00 3.80 - 5.40 mill/uL    Hemoglobin 15.1 12.0 - 16.0 g/dL    Hematocrit 44.4 35.0 - 47.0 %    MCV 89 80 - 100 fL    MCH 30.2 27.0 - 34.0 pg    MCHC 34.0 32.0 - 36.0 g/dL    RDW 12.1 11.0 - 14.5 %    Platelets 431 140 - 440 thou/uL    MPV 9.7 7.0 - 10.0 fL   Urinalysis-UC if Indicated   Result Value Ref Range    Color, UA Yellow Colorless, Yellow, Straw, Light Yellow    Clarity, UA Clear Clear    Glucose, UA Negative Negative    Protein, UA Negative Negative    Bilirubin, UA Negative Negative    Urobilinogen, UA 0.2 E.U./dL 0.2 E.U./dL, 1.0 E.U./dL    pH, UA 5.5 5.0 - 8.0    Blood, UA Negative Negative    Ketones, UA Negative Negative    Nitrite, UA Negative Negative    Leukocytes, UA Trace (!) Negative    Specific Gravity, UA 1.020 1.005 - 1.030    RBC, UA 0-2 None Seen,  0-2 hpf    WBC, UA 5-10 (!) None Seen, 0-5 hpf    Bacteria, UA Moderate (!) None Seen    Squam Epithel, UA  (!) None Seen, 0-5 lpf    Narrative    Urine Culture ordered based on [facility]' criteria   Lipid Cascade   Result Value Ref Range    Cholesterol 214 (H) <=199 mg/dL    Triglycerides 95 <=149 mg/dL    HDL Cholesterol 57 >=50 mg/dL    LDL Calculated 138 (H) <=129 mg/dL    Patient Fasting > 8hrs? Yes        All good    Please call with questions or contact us using [employer].    Sincerely,        Electronically signed by Niraj Biswas MD

## 2021-06-22 NOTE — PROGRESS NOTES
Assessment/Plan:    1. Elevated fasting glucose  Patient has a history of elevated fasting glucose, though no prior A1c's have confirmed diabetes.  Patient is clearly at risk given weight.  Given that I am unable to see confirm diagnosis of diabetes in the chart, I will remove that diagnosis from her problem list.  We will continue to monitor her A1c closely, due for check today.  - Glycosylated Hemoglobin A1c    2. Morbid obesity (H)  BMI 44.28 today.  Discussed healthy diet and regular exercise for weight loss.    3. Microalbuminuria  Urine test shows microalbuminuria in 2016 and 2017.  Will recheck today.  If persistent, would consider initiation of lisinopril for kidney protection.  - Microalbumin, Random Urine      Follow up: 1 yr for physical, sooner if A1c shows diabetes    Monisha Griffiths MD  [facility]    Subjective:    Patient ID: Natasha Thomas is a 73 y.o. female is here today for DM check    DM  -pt is confused as to why she is here since she states she doesn't have diabetes  -last A1c was 5.6% on 2/6/17 (no higher values documented in EMR)  -pt has had elevated blood sugars in the past   -no symptoms of diabetes including polydipsia, polyurea, weight loss  -pt is not currently taking any medications for blood sugar  -pt is obese and has struggled with diet and weight loss      Patient Active Problem List   Diagnosis     Hypothyroidism     Overweight     Benign Essential Hypertension     External Hemorrhoids     Chronic Recurrent Pancreatitis     Essential Hematuria     Cyst and pseudocyst of pancreas     Diverticulosis of colon     Morbid obesity (H)     Prediabetes     Past Medical History:   Diagnosis Date     High cholesterol      Hypertension      Pancreatitis      Past Surgical History:   Procedure Laterality Date     CHOLECYSTECTOMY       JOINT REPLACEMENT       SPINE SURGERY       Current Outpatient Medications on File Prior to Visit   Medication Sig Dispense Refill      albuterol (PROAIR HFA;PROVENTIL HFA;VENTOLIN HFA) 90 mcg/actuation inhaler Inhale 2 puffs every 6 (six) hours as needed for wheezing or shortness of breath. 2 Inhaler 3     amLODIPine (NORVASC) 5 MG tablet TAKE ONE TABLET BY MOUTH ONE TIME DAILY  90 tablet 1     aspirin 81 MG EC tablet Take 81 mg by mouth daily.       calcium carbonate-vitamin D2 500 mg(1,250mg) -200 unit tablet Take 1 tablet by mouth 2 (two) times a day.       calcium, as carbonate, (OS-KELSY) 500 mg calcium (1,250 mg) tablet Take 500 mg by mouth.       carvedilol (COREG) 12.5 MG tablet Take 1 tablet (12.5 mg total) by mouth 2 (two) times a day with meals. 180 tablet 1     CHOLECALCIFEROL, VITAMIN D3, (VITAMIN D3 ORAL) Take 3,000 Int'l Units by mouth.       co-enzyme Q-10 30 mg capsule Take 30 mg by mouth 3 (three) times a day.       CYANOCOBALAMIN, VITAMIN B-12, (VITAMIN B-12 ORAL) Take by mouth.       KRILL OIL ORAL Take by mouth.       MAGNESIUM ORAL Take by mouth.       mesalamine (LIALDA) 1.2 gram EC tablet Take 1.2 g by mouth daily.  5     omega-3 fatty acids-fish oil 340-1,000 mg cap Take 1 capsule by mouth.       POTASSIUM CHLORIDE ORAL Take by mouth.       TURMERIC ROOT EXTRACT ORAL Take by mouth.       VITAMIN E ACETATE (VITAMIN E ORAL) Take by mouth.       [DISCONTINUED] prednisoLONE acetate (PRED-FORTE) 1 % ophthalmic suspension   1     [DISCONTINUED] VIGAMOX 0.5 % ophthalmic solution   1     No current facility-administered medications on file prior to visit.      Allergies   Allergen Reactions     Penicillins Hives     Social History     Socioeconomic History     Marital status:      Spouse name: Not on file     Number of children: Not on file     Years of education: Not on file     Highest education level: Not on file   Social Needs     Financial resource strain: Not on file     Food insecurity - worry: Not on file     Food insecurity - inability: Not on file     Transportation needs - medical: Not on file     Transportation  "needs - non-medical: Not on file   Occupational History     Not on file   Tobacco Use     Smoking status: Former Smoker     Types: Cigarettes     Last attempt to quit:      Years since quittin.9     Smokeless tobacco: Never Used   Substance and Sexual Activity     Alcohol use: No     Comment: very rre social drink     Drug use: No     Sexual activity: Not on file   Other Topics Concern     Not on file   Social History Narrative     Not on file     Review of systems is as stated in HPI, and the remainder of system review is otherwise negative.    Objective:      /80   Pulse (!) 55   Ht 5' 1\" (1.549 m)   Wt (!) 234 lb 6 oz (106.3 kg)   SpO2 99%   BMI 44.28 kg/m      General appearance: awake, NAD, obese  HEENT: atraumatic, normocephalic, no scleral icterus or injection, moist mucous membranes  CV: RRR, no murmurs/rubs/gallops, normal S1 and S2  Lungs: CTAB, no wheezes or crackles, breathing comfortably on room air  Extremities: no LE edema bilaterally, moving all extremities  Neuro: alert, oriented x3, CNs grossly intact, no focal deficits appreciated  Psych: normal mood/affect/behavior, answering questions appropriately, linear thought process    "

## 2021-06-24 NOTE — TELEPHONE ENCOUNTER
Refill Approved    Rx renewed per Medication Renewal Policy. Medication was last renewed on 08/14/2018.    Amy Recinos, [facility] Triage/Med Refill 3/12/2019     Requested Prescriptions   Pending Prescriptions Disp Refills     amLODIPine (NORVASC) 5 MG tablet [Pharmacy Med Name: amLODIPine Besylate Oral Tablet 5 MG] 90 tablet 0     Sig: TAKE ONE TABLET BY MOUTH ONE TIME DAILY    Calcium-Channel Blockers Protocol Passed - 3/6/2019  7:53 PM       Passed - PCP or prescribing provider visit in past 12 months or next 3 months    Last office visit with prescriber/PCP: 2/23/2018 Niraj Biswas MD OR same dept: 12/14/2018 Monisha Griffiths MD OR same specialty: 12/14/2018 Monisha Griffiths MD  Last physical: 9/19/2018 Last MTM visit: Visit date not found   Next visit within 3 mo: Visit date not found  Next physical within 3 mo: Visit date not found  Prescriber OR PCP: Niraj Biswas MD  Last diagnosis associated with med order: 1. Essential hypertension  - amLODIPine (NORVASC) 5 MG tablet [Pharmacy Med Name: amLODIPine Besylate Oral Tablet 5 MG]; TAKE ONE TABLET BY MOUTH ONE TIME DAILY   Dispense: 90 tablet; Refill: 0    If protocol passes may refill for 12 months if within 3 months of last provider visit (or a total of 15 months).            Passed - Blood pressure filed in past 12 months    BP Readings from Last 1 Encounters:   12/14/18 120/80

## 2021-06-25 NOTE — PROGRESS NOTES
Progress Notes by Karla Salazar MD at 1/11/2017  9:10 AM     Author: Karla Salazar MD Service: -- Author Type: Physician    Filed: 1/11/2017 10:09 AM Encounter Date: 1/11/2017 Status: Signed    : Karla Salazar MD (Physician)           Click to link to [facility] HEART CARE NOTE    Thank you, Dr. Biswas, for asking the [facility] team to see Ms. Natasha Thomas to evaluate chest pain in the setting of a previous abnormal nuclear stress study.    Assessment/Recommendations   Assessment:    1.  Chest discomfort, etiology unknown.  She does not do much in the way of physical activity to clearly assess for exertional discomfort.  She has had intermittent discomfort over many years which comes on with emotional stress.  She did undergo a nuclear stress study in 2009 suggesting a small area of distal anterior and anterolateral ischemia.  In light of this, I would favor nuclear stress testing to see if any interval change.  2.  Abnormal nuclear stress study.  Again this suggested a small area of ischemia involving the distal anterior and anterolateral wall in 2009.  She never saw anyone in follow-up and has done well on medical therapy over the years.  Again based on her symptoms, I would favor repeat nuclear stress study.  3.  Hyperlipidemia, currently untreated.  Her last lipid profile demonstrated an LDL of 158.  There is told her she would likely need to go on statin therapy in an effort to drive her LDL below 100, especially if her repeat stress study suggests coronary artery disease.  4.  Hypertension, inadequately controlled.  I asked her to check her blood pressures at home for the next week and call us with those results    Plan:  1.  Continue current medication although consider further increase in amlodipine if blood pressure is high at home.    2.  Arrange outpatient nuclear stress study with further recommendations to follow       History of Present Illness    Ms.  Natasha Thomas is a 71 y.o. female with history of hypertension, hyperlipidemia and abnormal nuclear stress study in 2009 who presents today for evaluation.  She does have a history of atypical chest discomfort over a number of years.  She describes a vague discomfort in the left upper chest region when she is under emotional stress.  It typically will resolve if she sits down and does some deep breathing and relaxing exercises.  Because of these complaints, she did undergo a nuclear stress study in 2009 demonstrating a small area of distal anterior and anterolateral ischemia.  She was advised to follow-up with a cardiologist but never followed through.  Although she does not clearly report exertional chest discomfort, she admits to very limited physical activity because of musculoskeletal aches and pains.  She denies any prolonged episodes of chest discomfort.  No family history of early heart disease.  She does have hypertension which has been poorly controlled.  She also has hyperlipidemia with an LDL of 158 and is currently on no therapy.  She is a former smoker.    ECG (personally reviewed): No ECG available    ECHO (personally reviewed): No echo      Physical Examination Review of Systems   Vitals:    01/11/17 0952   BP: 144/78   Pulse:    Resp:      Body mass index is 46.09 kg/(m^2).  Wt Readings from Last 3 Encounters:   01/11/17 (!) 236 lb (107 kg)   12/14/16 (!) 235 lb (106.6 kg)   06/22/16 (!) 239 lb (108.4 kg)     General Appearance:   Awake, Alert, No acute distress.   HEENT:  No scleral icterus; the mucous membranes were pink and moist.   Neck: No cervical bruits or jugular venous distention    Chest: The spine was straight. The chest was symmetric.   Lungs:   Respirations unlabored; the lungs are clear to auscultation. No wheezing   Cardiovascular:    regular rate and rhythm.  S1, S2 normal.  No murmur, gallop or rub    Abdomen:  No organomegaly, masses, bruits, or tenderness. Bowels sounds are  present   Extremities:  no peripheral edema bilaterally.  No clubbing or cyanosis.  Distal pulses 2+ and symmetrical.     Skin: No xanthelasma. Warm, Dry.   Musculoskeletal: No tenderness.   Neurologic: Mood and affect are appropriate.    General: Night Sweats, Weight Gain  Eyes: WNL  Ears/Nose/Throat: WNL  Lungs: Cough, Shortness of Breath, Snoring  Heart: Chest Pain, Shortness of Breath with activity, Leg Swelling  Stomach: Constipation  Bladder: Frequent Urination at Night  Muscle/Joints: Joint Pain  Skin: WNL  Nervous System: WNL  Mental Health: WNL     Blood: WNL     Medical History  Surgical History Family History Social History   Past Medical History   Diagnosis Date   ? Hypertension    ? Pancreatitis     Past Surgical History   Procedure Laterality Date   ? Spine surgery     ? Cholecystectomy     ? Joint replacement      Family History   Problem Relation Age of Onset   ? Early death Mother    ? Cancer Father    ? No Medical Problems Brother    ? Breast cancer Other     Social History     Social History   ? Marital status:      Spouse name: N/A   ? Number of children: N/A   ? Years of education: N/A     Occupational History   ? Not on file.     Social History Main Topics   ? Smoking status: Former Smoker     Types: Cigarettes   ? Smokeless tobacco: Never Used   ? Alcohol use No      Comment: very rre social drink   ? Drug use: No   ? Sexual activity: Not on file     Other Topics Concern   ? Not on file     Social History Narrative          Medications  Allergies   Current Outpatient Prescriptions   Medication Sig Dispense Refill   ? amLODIPine (NORVASC) 5 MG tablet TAKE 1 TABLET (5 MG) BY MOUTH DAILY. 90 tablet 3   ? aspirin 81 MG EC tablet Take 81 mg by mouth daily.     ? calcium carbonate-vitamin D2 500 mg(1,250mg) -200 unit tablet Take 1 tablet by mouth 2 (two) times a day.     ? carvedilol (COREG CR) 20 MG 24 hr capsule TAKE 1 CAPSULE BY MOUTH EVERY MORNING WITH FOOD. 90 capsule 3   ?  CHOLECALCIFEROL, VITAMIN D3, (VITAMIN D3 ORAL) Take 3,000 Int'l Units by mouth.     ? co-enzyme Q-10 30 mg capsule Take 30 mg by mouth 3 (three) times a day.     ? CYANOCOBALAMIN, VITAMIN B-12, (VITAMIN B-12 ORAL) Take by mouth.     ? KRILL OIL ORAL Take by mouth.     ? MAGNESIUM ORAL Take by mouth.     ? naproxen (NAPROSYN) 500 MG tablet Take 1 tablet (500 mg total) by mouth 2 (two) times a day with meals. 60 tablet 2   ? POTASSIUM CHLORIDE ORAL Take by mouth.     ? TURMERIC ROOT EXTRACT ORAL Take by mouth.     ? VITAMIN E ACETATE (VITAMIN E ORAL) Take by mouth.     ? prednisoLONE acetate (PRED-FORTE) 1 % ophthalmic suspension   1   ? VIGAMOX 0.5 % ophthalmic solution   1     No current facility-administered medications for this visit.       Allergies   Allergen Reactions   ? Penicillins Hives         Lab Results    Chemistry/lipid CBC Cardiac Enzymes/BNP/TSH/INR   Lab Results   Component Value Date    CHOL 229 (H) 06/03/2016    HDL 53 06/03/2016    LDLCALC 158 (H) 06/03/2016    TRIG 89 06/03/2016    CREATININE 0.76 06/03/2016    BUN 18 06/03/2016    K 4.6 06/03/2016     06/03/2016     06/03/2016    CO2 30 06/03/2016    Lab Results   Component Value Date    WBC 7.1 06/03/2016    HGB 15.4 06/22/2016    HCT 45.1 06/03/2016    MCV 89 06/03/2016     (H) 06/03/2016    Lab Results   Component Value Date    TROPONINI 0.01 10/03/2009    TSH 0.79 06/03/2016    INR 1.31 (H) 09/26/2009

## 2021-06-26 ENCOUNTER — COMMUNICATION - HEALTHEAST (OUTPATIENT)
Dept: FAMILY MEDICINE | Facility: CLINIC | Age: 76
End: 2021-06-26

## 2021-06-26 DIAGNOSIS — I10 ESSENTIAL HYPERTENSION: ICD-10-CM

## 2021-06-27 RX ORDER — CARVEDILOL 12.5 MG/1
TABLET ORAL
Qty: 180 TABLET | Refills: 2 | Status: SHIPPED | OUTPATIENT
Start: 2021-06-27 | End: 2022-12-08

## 2021-07-03 NOTE — ADDENDUM NOTE
Addendum Note by Yasmany Alonzo DO at 8/29/2017  1:58 PM     Author: Yasmany Alonzo DO Service: -- Author Type: Physician    Filed: 8/29/2017  1:58 PM Encounter Date: 8/29/2017 Status: Signed    : Yasmany Alonzo DO (Physician)    Addended by: YASMANY ALONZO on: 8/29/2017 01:58 PM        Modules accepted: Orders

## 2021-07-07 NOTE — TELEPHONE ENCOUNTER
Refill Approved    Rx renewed per Medication Renewal Policy. Medication was last renewed on 11/6/20, last OV 3/31/21 .    Vonda Garay, [facility] Triage/Med Refill 6/27/2021     Requested Prescriptions   Pending Prescriptions Disp Refills     carvediloL (COREG) 12.5 MG tablet [Pharmacy Med Name: Carvedilol Oral Tablet 12.5 MG] 180 tablet 0     Sig: Take 1 tablet by mouth 2 times a day with meals.       Beta-Blockers Refill Protocol Passed - 6/26/2021  9:39 AM        Passed - PCP or prescribing provider visit in past 12 months or next 3 months     Last office visit with prescriber/PCP: 2/23/2018 Niraj Biswas MD OR same dept: Visit date not found OR same specialty: 12/14/2018 Monisha Griffiths MD  Last physical: 3/31/2021 Last MTM visit: Visit date not found   Next visit within 3 mo: Visit date not found  Next physical within 3 mo: Visit date not found  Prescriber OR PCP: Niraj Biswas MD  Last diagnosis associated with med order: 1. Essential hypertension  - carvediloL (COREG) 12.5 MG tablet [Pharmacy Med Name: Carvedilol Oral Tablet 12.5 MG]; Take 1 tablet by mouth 2 times a day with meals.  Dispense: 180 tablet; Refill: 0    If protocol passes may refill for 12 months if within 3 months of last provider visit (or a total of 15 months).             Passed - Blood pressure filed in past 12 months     BP Readings from Last 1 Encounters:   03/31/21 130/80

## 2021-07-10 ENCOUNTER — HEALTH MAINTENANCE LETTER (OUTPATIENT)
Age: 76
End: 2021-07-10

## 2021-09-04 ENCOUNTER — HEALTH MAINTENANCE LETTER (OUTPATIENT)
Age: 76
End: 2021-09-04

## 2022-04-26 DIAGNOSIS — I10 ESSENTIAL HYPERTENSION: ICD-10-CM

## 2022-04-29 NOTE — TELEPHONE ENCOUNTER
"Routing refill request to provider for review/approval because:  Labs not current:  Creatinine  Patient needs to be seen because it has been more than 1 year since last office visit.  BP not current    Last Written Prescription Date:  4/27/21  Last Fill Quantity: 90,  # refills: 3   Last office visit provider:  3/31/21     Requested Prescriptions   Pending Prescriptions Disp Refills     amLODIPine (NORVASC) 5 MG tablet [Pharmacy Med Name: amLODIPine Besylate Oral Tablet 5 MG] 90 tablet 0     Sig: Take 1 tablet (5 mg total) by mouth daily.       Calcium Channel Blockers Protocol  Failed - 4/29/2022 11:39 AM        Failed - Blood pressure under 140/90 in past 12 months     BP Readings from Last 3 Encounters:   03/31/21 130/80   09/25/19 124/70                 Failed - Recent (12 mo) or future (30 days) visit within the authorizing provider's specialty     Patient has had an office visit with the authorizing provider or a provider within the authorizing providers department within the previous 12 mos or has a future within next 30 days. See \"Patient Info\" tab in inbasket, or \"Choose Columns\" in Meds & Orders section of the refill encounter.              Failed - Normal serum creatinine on file in past 12 months     Recent Labs   Lab Test 03/31/21  1136   CR 0.72       Ok to refill medication if creatinine is low          Passed - Medication is active on med list        Passed - Patient is age 18 or older        Passed - No active pregnancy on record        Passed - No positive pregnancy test in past 12 months             Humberto Mari RN 04/29/22 11:39 AM    "

## 2022-05-01 RX ORDER — AMLODIPINE BESYLATE 5 MG/1
TABLET ORAL
Qty: 90 TABLET | Refills: 0 | Status: SHIPPED | OUTPATIENT
Start: 2022-05-01 | End: 2022-08-04

## 2022-05-27 ENCOUNTER — ANCILLARY PROCEDURE (OUTPATIENT)
Dept: MAMMOGRAPHY | Facility: CLINIC | Age: 77
End: 2022-05-27
Attending: FAMILY MEDICINE
Payer: COMMERCIAL

## 2022-05-27 DIAGNOSIS — Z12.31 VISIT FOR SCREENING MAMMOGRAM: ICD-10-CM

## 2022-05-27 PROCEDURE — 77067 SCR MAMMO BI INCL CAD: CPT

## 2022-06-11 ENCOUNTER — HEALTH MAINTENANCE LETTER (OUTPATIENT)
Age: 77
End: 2022-06-11

## 2022-08-03 DIAGNOSIS — I10 ESSENTIAL HYPERTENSION: ICD-10-CM

## 2022-08-04 RX ORDER — AMLODIPINE BESYLATE 5 MG/1
TABLET ORAL
Qty: 90 TABLET | Refills: 0 | Status: SHIPPED | OUTPATIENT
Start: 2022-08-04 | End: 2022-11-12

## 2022-08-04 RX ORDER — CARVEDILOL 12.5 MG/1
TABLET ORAL
Qty: 180 TABLET | Refills: 0 | Status: SHIPPED | OUTPATIENT
Start: 2022-08-04 | End: 2022-11-17

## 2022-08-04 NOTE — TELEPHONE ENCOUNTER
"Routing refill request to provider for review/approval because:  Patient needs to be seen because it has been more than 1 year since last office visit.    Last Written Prescription Date:  5/1/22   Last Fill Quantity: 90,  # refills: 0   Last office visit provider:  3/31/21     Requested Prescriptions   Pending Prescriptions Disp Refills     amLODIPine (NORVASC) 5 MG tablet [Pharmacy Med Name: amLODIPine Besylate Oral Tablet 5 MG] 90 tablet 0     Sig: TAKE ONE TABLET BY MOUTH ONE TIME DAILY       Calcium Channel Blockers Protocol  Failed - 8/3/2022  8:16 PM        Failed - Blood pressure under 140/90 in past 12 months     BP Readings from Last 3 Encounters:   03/31/21 130/80   09/25/19 124/70                 Failed - Recent (12 mo) or future (30 days) visit within the authorizing provider's specialty     Patient has had an office visit with the authorizing provider or a provider within the authorizing providers department within the previous 12 mos or has a future within next 30 days. See \"Patient Info\" tab in inbasket, or \"Choose Columns\" in Meds & Orders section of the refill encounter.              Failed - Normal serum creatinine on file in past 12 months     Recent Labs   Lab Test 03/31/21  1136   CR 0.72       Ok to refill medication if creatinine is low          Passed - Medication is active on med list        Passed - Patient is age 18 or older        Passed - No active pregnancy on record        Passed - No positive pregnancy test in past 12 months           carvedilol (COREG) 12.5 MG tablet [Pharmacy Med Name: Carvedilol Oral Tablet 12.5 MG] 180 tablet 0     Sig: TAKE ONE TABLET BY MOUTH TWICE DAILY with meals       Beta-Blockers Protocol Failed - 8/3/2022  8:16 PM        Failed - Blood pressure under 140/90 in past 12 months     BP Readings from Last 3 Encounters:   03/31/21 130/80   09/25/19 124/70                 Failed - Recent (12 mo) or future (30 days) visit within the authorizing provider's " "specialty     Patient has had an office visit with the authorizing provider or a provider within the authorizing providers department within the previous 12 mos or has a future within next 30 days. See \"Patient Info\" tab in inbasket, or \"Choose Columns\" in Meds & Orders section of the refill encounter.              Passed - Patient is age 6 or older        Passed - Medication is active on med list             Em Casey RN 08/04/22 1:06 PM  "

## 2022-10-16 ENCOUNTER — HEALTH MAINTENANCE LETTER (OUTPATIENT)
Age: 77
End: 2022-10-16

## 2022-11-10 DIAGNOSIS — I10 ESSENTIAL HYPERTENSION: ICD-10-CM

## 2022-11-12 RX ORDER — AMLODIPINE BESYLATE 5 MG/1
TABLET ORAL
Qty: 90 TABLET | Refills: 0 | Status: SHIPPED | OUTPATIENT
Start: 2022-11-12 | End: 2022-12-08

## 2022-11-12 NOTE — TELEPHONE ENCOUNTER
"Routing refill request to provider for review/approval because:  Patient needs to be seen because it has been more than 1 year since last office visit.    Last Written Prescription Date:  8/4/22  Last Fill Quantity: 90,  # refills: 0   Last office visit provider:  3/31/21     Requested Prescriptions   Pending Prescriptions Disp Refills     amLODIPine (NORVASC) 5 MG tablet [Pharmacy Med Name: amLODIPine Besylate Oral Tablet 5 MG] 90 tablet 0     Sig: TAKE ONE TABLET BY MOUTH ONE TIME DAILY.       Calcium Channel Blockers Protocol  Failed - 11/10/2022  7:35 PM        Failed - Blood pressure under 140/90 in past 12 months     BP Readings from Last 3 Encounters:   03/31/21 130/80   09/25/19 124/70                 Failed - Recent (12 mo) or future (30 days) visit within the authorizing provider's specialty     Patient has had an office visit with the authorizing provider or a provider within the authorizing providers department within the previous 12 mos or has a future within next 30 days. See \"Patient Info\" tab in inbasket, or \"Choose Columns\" in Meds & Orders section of the refill encounter.              Failed - Normal serum creatinine on file in past 12 months     Recent Labs   Lab Test 03/31/21  1136   CR 0.72       Ok to refill medication if creatinine is low          Passed - Medication is active on med list        Passed - Patient is age 18 or older        Passed - No active pregnancy on record        Passed - No positive pregnancy test in past 12 months             Em Casey RN 11/12/22 9:33 AM  "

## 2022-11-15 DIAGNOSIS — I10 ESSENTIAL HYPERTENSION: ICD-10-CM

## 2022-11-16 NOTE — TELEPHONE ENCOUNTER
"Routing refill request to provider for review/approval because:  bp not current  Due to be seen    Last Written Prescription Date:  8/4/22  Last Fill Quantity: 180,  # refills: 0   Last office visit provider:  3/31/21     Requested Prescriptions   Pending Prescriptions Disp Refills     carvedilol (COREG) 12.5 MG tablet [Pharmacy Med Name: Carvedilol Oral Tablet 12.5 MG] 180 tablet 0     Sig: TAKE ONE TABLET BY MOUTH TWICE DAILY WITH MEALS.       Beta-Blockers Protocol Failed - 11/15/2022  6:36 PM        Failed - Blood pressure under 140/90 in past 12 months     BP Readings from Last 3 Encounters:   03/31/21 130/80   09/25/19 124/70                 Passed - Patient is age 6 or older        Passed - Recent (12 mo) or future (30 days) visit within the authorizing provider's specialty     Patient has had an office visit with the authorizing provider or a provider within the authorizing providers department within the previous 12 mos or has a future within next 30 days. See \"Patient Info\" tab in inbasket, or \"Choose Columns\" in Meds & Orders section of the refill encounter.              Passed - Medication is active on med list             Elsie, Em, RN 11/16/22 3:30 PM  "

## 2022-11-17 RX ORDER — CARVEDILOL 12.5 MG/1
TABLET ORAL
Qty: 180 TABLET | Refills: 0 | Status: SHIPPED | OUTPATIENT
Start: 2022-11-17 | End: 2023-07-03

## 2022-12-06 PROBLEM — R31.9 ESSENTIAL HEMATURIA: Status: ACTIVE | Noted: 2022-12-06

## 2022-12-06 PROBLEM — K64.4 EXTERNAL HEMORRHOIDS: Status: ACTIVE | Noted: 2022-12-06

## 2022-12-06 PROBLEM — R73.03 PREDIABETES: Status: ACTIVE | Noted: 2018-12-14

## 2022-12-06 PROBLEM — I10 BENIGN ESSENTIAL HYPERTENSION: Status: ACTIVE | Noted: 2022-12-06

## 2022-12-06 PROBLEM — E66.3 OVERWEIGHT: Status: ACTIVE | Noted: 2022-12-06

## 2022-12-06 PROBLEM — K86.1 CHRONIC RECURRENT PANCREATITIS (H): Status: ACTIVE | Noted: 2022-12-06

## 2022-12-06 PROBLEM — E03.9 HYPOTHYROIDISM: Status: ACTIVE | Noted: 2022-12-06

## 2022-12-06 PROBLEM — E66.01 MORBID OBESITY (H): Status: ACTIVE | Noted: 2018-12-14

## 2022-12-06 RX ORDER — BUTALB/ACETAMINOPHEN/CAFFEINE 50-325-40
30 TABLET ORAL
COMMUNITY
End: 2024-03-14

## 2022-12-06 RX ORDER — FAMOTIDINE 20 MG/1
20 TABLET, FILM COATED ORAL
COMMUNITY
Start: 2021-03-31 | End: 2023-07-03

## 2022-12-08 ENCOUNTER — OFFICE VISIT (OUTPATIENT)
Dept: FAMILY MEDICINE | Facility: CLINIC | Age: 77
End: 2022-12-08
Payer: COMMERCIAL

## 2022-12-08 VITALS
HEART RATE: 60 BPM | SYSTOLIC BLOOD PRESSURE: 134 MMHG | WEIGHT: 226 LBS | DIASTOLIC BLOOD PRESSURE: 82 MMHG | RESPIRATION RATE: 18 BRPM | BODY MASS INDEX: 44.37 KG/M2 | OXYGEN SATURATION: 97 % | HEIGHT: 60 IN

## 2022-12-08 DIAGNOSIS — Z00.00 MEDICARE ANNUAL WELLNESS VISIT, SUBSEQUENT: Primary | ICD-10-CM

## 2022-12-08 DIAGNOSIS — I10 ESSENTIAL HYPERTENSION, BENIGN: ICD-10-CM

## 2022-12-08 DIAGNOSIS — E66.01 MORBID OBESITY (H): ICD-10-CM

## 2022-12-08 DIAGNOSIS — R22.32 MASS OF SKIN OF LEFT SHOULDER: ICD-10-CM

## 2022-12-08 PROBLEM — K63.5 POLYP OF COLON: Status: ACTIVE | Noted: 2019-11-27

## 2022-12-08 PROBLEM — D12.6 BENIGN NEOPLASM OF COLON: Status: ACTIVE | Noted: 2019-12-02

## 2022-12-08 PROBLEM — R19.7 DIARRHEA: Status: ACTIVE | Noted: 2022-12-08

## 2022-12-08 PROBLEM — K52.9 NONINFECTIOUS GASTROENTERITIS: Status: ACTIVE | Noted: 2017-10-23

## 2022-12-08 PROBLEM — R10.32 LEFT LOWER QUADRANT PAIN: Status: ACTIVE | Noted: 2022-12-08

## 2022-12-08 PROBLEM — K51.90 ULCERATIVE COLITIS (H): Status: ACTIVE | Noted: 2019-12-02

## 2022-12-08 LAB
ALBUMIN SERPL BCG-MCNC: 4 G/DL (ref 3.5–5.2)
ALP SERPL-CCNC: 107 U/L (ref 35–104)
ALT SERPL W P-5'-P-CCNC: 19 U/L (ref 10–35)
ANION GAP SERPL CALCULATED.3IONS-SCNC: 7 MMOL/L (ref 7–15)
AST SERPL W P-5'-P-CCNC: 52 U/L (ref 10–35)
BILIRUB SERPL-MCNC: 0.7 MG/DL
BUN SERPL-MCNC: 13 MG/DL (ref 8–23)
CALCIUM SERPL-MCNC: 9.6 MG/DL (ref 8.8–10.2)
CHLORIDE SERPL-SCNC: 104 MMOL/L (ref 98–107)
CHOLEST SERPL-MCNC: 207 MG/DL
CREAT SERPL-MCNC: 0.7 MG/DL (ref 0.51–0.95)
DEPRECATED HCO3 PLAS-SCNC: 29 MMOL/L (ref 22–29)
ERYTHROCYTE [DISTWIDTH] IN BLOOD BY AUTOMATED COUNT: 11.9 % (ref 10–15)
GFR SERPL CREATININE-BSD FRML MDRD: 89 ML/MIN/1.73M2
GLUCOSE SERPL-MCNC: 111 MG/DL (ref 70–99)
HCT VFR BLD AUTO: 43.7 % (ref 35–47)
HDLC SERPL-MCNC: 48 MG/DL
HGB BLD-MCNC: 15 G/DL (ref 11.7–15.7)
LDLC SERPL CALC-MCNC: 141 MG/DL
MCH RBC QN AUTO: 31.1 PG (ref 26.5–33)
MCHC RBC AUTO-ENTMCNC: 34.3 G/DL (ref 31.5–36.5)
MCV RBC AUTO: 91 FL (ref 78–100)
NONHDLC SERPL-MCNC: 159 MG/DL
PLATELET # BLD AUTO: 424 10E3/UL (ref 150–450)
POTASSIUM SERPL-SCNC: 4.5 MMOL/L (ref 3.4–5.3)
PROT SERPL-MCNC: 7.3 G/DL (ref 6.4–8.3)
RBC # BLD AUTO: 4.83 10E6/UL (ref 3.8–5.2)
SODIUM SERPL-SCNC: 140 MMOL/L (ref 136–145)
TRIGL SERPL-MCNC: 89 MG/DL
TSH SERPL DL<=0.005 MIU/L-ACNC: 0.82 UIU/ML (ref 0.3–4.2)
WBC # BLD AUTO: 7.2 10E3/UL (ref 4–11)

## 2022-12-08 PROCEDURE — 80061 LIPID PANEL: CPT | Performed by: FAMILY MEDICINE

## 2022-12-08 PROCEDURE — 85027 COMPLETE CBC AUTOMATED: CPT | Performed by: FAMILY MEDICINE

## 2022-12-08 PROCEDURE — 84443 ASSAY THYROID STIM HORMONE: CPT | Performed by: FAMILY MEDICINE

## 2022-12-08 PROCEDURE — 36415 COLL VENOUS BLD VENIPUNCTURE: CPT | Performed by: FAMILY MEDICINE

## 2022-12-08 PROCEDURE — 99397 PER PM REEVAL EST PAT 65+ YR: CPT | Performed by: FAMILY MEDICINE

## 2022-12-08 PROCEDURE — 80053 COMPREHEN METABOLIC PANEL: CPT | Performed by: FAMILY MEDICINE

## 2022-12-08 PROCEDURE — 99213 OFFICE O/P EST LOW 20 MIN: CPT | Mod: 25 | Performed by: FAMILY MEDICINE

## 2022-12-08 RX ORDER — AMLODIPINE BESYLATE 5 MG/1
5 TABLET ORAL DAILY
Qty: 90 TABLET | Refills: 3 | Status: SHIPPED | OUTPATIENT
Start: 2022-12-08 | End: 2023-12-20

## 2022-12-08 RX ORDER — CARVEDILOL 12.5 MG/1
TABLET ORAL
Qty: 180 TABLET | Refills: 3 | Status: SHIPPED | OUTPATIENT
Start: 2022-12-08 | End: 2024-07-03

## 2022-12-08 ASSESSMENT — ENCOUNTER SYMPTOMS
JOINT SWELLING: 0
PARESTHESIAS: 0
NAUSEA: 0
ABDOMINAL PAIN: 1
FREQUENCY: 0
FEVER: 0
CONSTIPATION: 1
CHILLS: 0
COUGH: 0
DIZZINESS: 0
DIARRHEA: 0
HEADACHES: 0
EYE PAIN: 0
MYALGIAS: 0
ARTHRALGIAS: 0
SORE THROAT: 0
HEMATOCHEZIA: 0
PALPITATIONS: 0
DYSURIA: 0
NERVOUS/ANXIOUS: 0
BREAST MASS: 0
HEMATURIA: 0
WEAKNESS: 0

## 2022-12-08 ASSESSMENT — ACTIVITIES OF DAILY LIVING (ADL)
TOILETING_ISSUES: NO
WALKING_OR_CLIMBING_STAIRS_DIFFICULTY: NO
DIFFICULTY_EATING/SWALLOWING: NO
CONCENTRATING,_REMEMBERING_OR_MAKING_DECISIONS_DIFFICULTY: NO
DRESSING/BATHING_DIFFICULTY: NO
WEAR_GLASSES_OR_BLIND: YES
CURRENT_FUNCTION: NO ASSISTANCE NEEDED
FALL_HISTORY_WITHIN_LAST_SIX_MONTHS: NO
CHANGE_IN_FUNCTIONAL_STATUS_SINCE_ONSET_OF_CURRENT_ILLNESS/INJURY: NO
DOING_ERRANDS_INDEPENDENTLY_DIFFICULTY: NO
HEARING_DIFFICULTY_OR_DEAF: NO
DIFFICULTY_COMMUNICATING: NO

## 2022-12-08 ASSESSMENT — PAIN SCALES - GENERAL: PAINLEVEL: NO PAIN (0)

## 2022-12-08 NOTE — LETTER
December 10, 2022      Natasha Thomas  [address] [place] [address]        Dear ,    We are writing to inform you of your test results.    No changes..tests are stable    Resulted Orders   Comprehensive metabolic panel (BMP + Alb, Alk Phos, ALT, AST, Total. Bili, TP)   Result Value Ref Range    Sodium 140 136 - 145 mmol/L    Potassium 4.5 3.4 - 5.3 mmol/L    Chloride 104 98 - 107 mmol/L    Carbon Dioxide (CO2) 29 22 - 29 mmol/L    Anion Gap 7 7 - 15 mmol/L    Urea Nitrogen 13.0 8.0 - 23.0 mg/dL    Creatinine 0.70 0.51 - 0.95 mg/dL    Calcium 9.6 8.8 - 10.2 mg/dL    Glucose 111 (H) 70 - 99 mg/dL    Alkaline Phosphatase 107 (H) 35 - 104 U/L    AST 52 (H) 10 - 35 U/L    ALT 19 10 - 35 U/L    Protein Total 7.3 6.4 - 8.3 g/dL    Albumin 4.0 3.5 - 5.2 g/dL    Bilirubin Total 0.7 <=1.2 mg/dL    GFR Estimate 89 >60 mL/min/1.73m2      Comment:      Effective December 21, 2021 eGFRcr in adults is calculated using the 2021 CKD-EPI creatinine equation which includes age and gender (Michelle et al., NEJM, DOI: 10.1056/TMPUcg5492720)   Lipid panel reflex to direct LDL Fasting   Result Value Ref Range    Cholesterol 207 (H) <200 mg/dL    Triglycerides 89 <150 mg/dL    Direct Measure HDL 48 (L) >=50 mg/dL    LDL Cholesterol Calculated 141 (H) <=100 mg/dL    Non HDL Cholesterol 159 (H) <130 mg/dL    Narrative    Cholesterol  Desirable:  <200 mg/dL    Triglycerides  Normal:  Less than 150 mg/dL  Borderline High:  150-199 mg/dL  High:  200-499 mg/dL  Very High:  Greater than or equal to 500 mg/dL    Direct Measure HDL  Female:  Greater than or equal to 50 mg/dL   Male:  Greater than or equal to 40 mg/dL    LDL Cholesterol  Desirable:  <100mg/dL  Above Desirable:  100-129 mg/dL   Borderline High:  130-159 mg/dL   High:  160-189 mg/dL   Very High:  >= 190 mg/dL    Non HDL Cholesterol  Desirable:  130 mg/dL  Above Desirable:  130-159 mg/dL  Borderline High:  160-189 mg/dL  High:  190-219 mg/dL  Very High:  Greater  than or equal to 220 mg/dL   CBC with platelets   Result Value Ref Range    WBC Count 7.2 4.0 - 11.0 10e3/uL    RBC Count 4.83 3.80 - 5.20 10e6/uL    Hemoglobin 15.0 11.7 - 15.7 g/dL    Hematocrit 43.7 35.0 - 47.0 %    MCV 91 78 - 100 fL    MCH 31.1 26.5 - 33.0 pg    MCHC 34.3 31.5 - 36.5 g/dL    RDW 11.9 10.0 - 15.0 %    Platelet Count 424 150 - 450 10e3/uL   TSH with free T4 reflex   Result Value Ref Range    TSH 0.82 0.30 - 4.20 uIU/mL       If you have any questions or concerns, please call the clinic at the number listed above.       Sincerely,      Niraj Biswas MD

## 2022-12-08 NOTE — PROGRESS NOTES
SUBJECTIVE:   Natasha is a 77 year old who presents for Preventive Visit.  Patient has been advised of split billing requirements and indicates understanding: Yesre you in the first 12 months of your Medicare coverage?  No  Chief complaint I am here for my annual checkup; I am having trouble with some abdominal pain; my left shoulder area has a bump on it that I am worried about  Have you ever done Advance Care Planning? (For example, a Health Directive, POLST, or a discussion with a medical provider or your loved ones about your wishes)  History of present illness and addendum; Natasha has been under my care for decades.  She presents for annual physical.  She does have benign essential hypertension well managed on medication hypercholesterol anemia history of pancreatitis; morbid obesity and recently has lost her  of 61 years of marriage.  She is in the morning.  She noticed a lump or bump or firmness in her left upper shoulder area which is slightly painful her chiropractor was not concerned about it but did ask us to see it in primary care it appears to be a muscular swelling I do not palpate a firm mass I like to see again in 3 months.  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  She denies any shortness of breath or dyspnea she is under the care of cardiology.  All medical questions asked were answered I have personally reviewed family social history surgeries allergies problems list.  We will see her for follow-up at the 3-month.Constitutional, HEENT, cardiovascular, pulmonary, gi and gu systems are negative, except as otherwise noted.  Cognitive Screening   1) Repeat 3 items (Leader, Season, Table)    2) Clock draw:   3) 3 item recall: Recalls 3 objects  Results: 3 items recalled: COGNITIVE IMPAIRMENT LESS LIKELY    Mini-CogTM Copyright S Joey. Licensed by the author for use in [employer]; reprinted with permission (soob@[email or web address]). All rights reserved.      Reviewed and updated as needed this visit by clinical staff    Reviewed and updated as needed this visit by Provider    Social History     Tobacco Use     Smoking status: Former     Types: Cigarettes     Quit date: 1997     Years since quittin.9     Smokeless tobacco: Never   Substance Use Topics     Alcohol use: No     Comment: Alcoholic Drinks/day: very rre social drink     If you drink alcohol do you typically have >3 drinks per day or >7 drinks per week? No    Alcohol Use 2022   Prescreen: >3 drinks/day or >7 drinks/week? No   No flowsheet data found.      Current providers sharing in care for this patient include:  Patient Care Team:  Niraj Biswas MD as PCP - General  Niraj Biswas MD as Assigned PCP    The following health maintenance items are reviewed in Epic and correct as of today:  Health Maintenance   Topic Date Due     ANNUAL REVIEW OF  ORDERS  Never done     HEPATITIS C SCREENING  Never done     ZOSTER IMMUNIZATION (1 of 2) Never done     MEDICARE ANNUAL WELLNESS VISIT  2022     FALL RISK ASSESSMENT  2023     ADVANCE CARE PLANNING  2024     LIPID  [id number]     DTAP/TDAP/TD IMMUNIZATION (2 - Td or Tdap) 2027     DEXA  2032     PHQ-2 (once per calendar year)  Completed     INFLUENZA VACCINE  Completed     Pneumococcal Vaccine: 65+ Years  Completed     COVID-19 Vaccine  Completed     IPV IMMUNIZATION  Aged Out     MENINGITIS IMMUNIZATION  Aged Out     MAMMO SCREENING  Discontinued     COLORECTAL CANCER SCREENING  Discontinued     Lab work is in process        Pertinent mammograms are reviewed under the imaging tab.    Review of Systems  Constitutional, HEENT, cardiovascular, pulmonary, GI, , musculoskeletal, neuro, skin, endocrine and psych systems are negative, except as otherwise noted.    OBJECTIVE:   /82   Pulse 60   Resp 18   Ht 1.524 m (5')   Wt 102.5 kg (226  lb)   SpO2 97%   BMI 44.14 kg/m   Estimated body mass index is 44.14 kg/m  as calculated from the following:    Height as of this encounter: 1.524 m (5').    Weight as of this encounter: 102.5 kg (226 lb).  Physical Exam  GENERAL: healthy, alert and no distress  EYES: Eyes grossly normal to inspection, PERRL and conjunctivae and sclerae normal  HENT: ear canals and TM's normal, nose and mouth without ulcers or lesions  NECK: no adenopathy, no asymmetry, masses, or scars and thyroid normal to palpation  RESP: lungs clear to auscultation - no rales, rhonchi or wheezes  BREAST: normal without masses, tenderness or nipple discharge and no palpable axillary masses or adenopathy  CV: regular rate and rhythm, normal S1 S2, no S3 or S4, no murmur, click or rub, no peripheral edema and peripheral pulses strong  ABDOMEN: soft, nontender, no hepatosplenomegaly, no masses and bowel sounds normal  MS: no gross musculoskeletal defects noted, no edema  SKIN: no suspicious lesions or rashes  NEURO: Normal strength and tone, mentation intact and speech normal  PSYCH: mentation appears normal, affect normal/bright        ASSESSMENT / PLAN:       ICD-10-CM    1. Medicare annual wellness visit, subsequent  Z00.00 Comprehensive metabolic panel (BMP + Alb, Alk Phos, ALT, AST, Total. Bili, TP)     Lipid panel reflex to direct LDL Fasting     CBC with platelets     TSH with free T4 reflex     Comprehensive metabolic panel (BMP + Alb, Alk Phos, ALT, AST, Total. Bili, TP)     Lipid panel reflex to direct LDL Fasting     CBC with platelets     TSH with free T4 reflex      2. Essential hypertension, benign  I10 Comprehensive metabolic panel (BMP + Alb, Alk Phos, ALT, AST, Total. Bili, TP)     Comprehensive metabolic panel (BMP + Alb, Alk Phos, ALT, AST, Total. Bili, TP)      3. Morbid obesity (H)  E66.01           Patient has been advised of split billing requirements and indicates understanding: Yes      COUNSELING:        BMI:   Estimated  "body mass index is 44.14 kg/m  as calculated from the following:    Height as of this encounter: 1.524 m (5').    Weight as of this encounter: 102.5 kg (226 lb).   Weight management plan: Discussed healthy diet and exercise guidelines      She reports that she quit smoking about 25 years ago. Her smoking use included cigarettes. She has never used smokeless tobacco.      Appropriate preventive services were discussed with this patient, including applicable screening as appropriate for cardiovascular disease, diabetes, osteopenia/osteoporosis, and glaucoma.  As appropriate for age/gender, discussed screening for colorectal cancer, prostate cancer, breast cancer, and cervical cancer. Checklist reviewing preventive services available has been given to the patient.    Reviewed patients plan of care and provided an AVS. The Basic Care Plan (routine screening as documented in Health Maintenance) for Natasha meets the Care Plan requirement. This Care Plan has been established and reviewed with the Patient.      Niraj Biswas MD  [facility]    Identified Health Risks:  Answers for HPI/ROS submitted by the patient on 12/8/2022  In general, how would you rate your overall physical health?: fair  Frequency of exercise:: None  Do you usually eat at least 4 servings of fruit and vegetables a day, include whole grains & fiber, and avoid regularly eating high fat or \"junk\" foods? : No  Taking medications regularly:: Yes  Medication side effects:: None  Activities of Daily Living: no assistance needed  Home safety: no safety concerns identified  Hearing Impairment:: no hearing concerns  In the past 6 months, have you been bothered by leaking of urine?: Yes  In general, how would you rate your overall mental or emotional health?: good  Additional concerns today:: No      "

## 2022-12-15 ENCOUNTER — NURSE TRIAGE (OUTPATIENT)
Dept: NURSING | Facility: CLINIC | Age: 77
End: 2022-12-15

## 2022-12-15 NOTE — TELEPHONE ENCOUNTER
S-(situation): cough    B-(background):   Cough started on Sunday 12/11  Negative COVID test    Currently taking OTC Mucinex    A-(assessment):   Cough, mostly productive. Coughs up greenish phlegm  Nasal congestion  Runny nose  Hard to breathe when laying flat on bed  No wheezing  No SOB.   No fever      R-(recommendations):   Virtual visit today or tomorrow  Daughter Michelle was given authority to schedule by pt.    Advised:  - Pt took Allegra this morning. Take another one tomorrow  - Continue Mucinex per package instruction  - Breathe warm mist for coughing fits  Increase fluid intake  - call if symptoms get worse    Paula Malin RN/[place] Nurse Advisor      Reason for Disposition    Continuous (nonstop) coughing interferes with work or school and no improvement using cough treatment per Care Advice    Additional Information    Negative: Bluish (or gray) lips or face    Negative: SEVERE difficulty breathing (e.g., struggling for each breath, speaks in single words)    Negative: Rapid onset of cough and has hives    Negative: Coughing started suddenly after medicine, an allergic food or bee sting    Negative: Difficulty breathing after exposure to flames, smoke, or fumes    Negative: Sounds like a life-threatening emergency to the triager    Negative: Previous asthma attacks and this feels like asthma attack    Negative: Dry cough (non-productive; no sputum or minimal clear sputum) and within 14 days of COVID-19 Exposure    Negative: MODERATE difficulty breathing (e.g., speaks in phrases, SOB even at rest, pulse 100-120) and still present when not coughing    Negative: Chest pain present when not coughing    Negative: Passed out (i.e., fainted, collapsed and was not responding)    Negative: Patient sounds very sick or weak to the triager    Negative: MILD difficulty breathing (e.g., minimal/no SOB at rest, SOB with walking, pulse <100) and still present when not coughing    Negative: Coughed up > 1 tablespoon  (15 ml) blood (Exception: Blood-tinged sputum.)    Negative: Fever > 103 F (39.4 C)    Negative: Fever > 101 F (38.3 C) and over 60 years of age    Negative: Fever > 100.0 F (37.8 C) and has diabetes mellitus or a weak immune system (e.g., HIV positive, cancer chemotherapy, organ transplant, splenectomy, chronic steroids)    Negative: Fever > 100.0 F (37.8 C) and bedridden (e.g., nursing home patient, stroke, chronic illness, recovering from surgery)    Negative: Increasing ankle swelling    Negative: Wheezing is present    Negative: SEVERE coughing spells (e.g., whooping sound after coughing, vomiting after coughing)    Negative: Coughing up soraya-colored (reddish-brown) or blood-tinged sputum    Negative: Fever present > 3 days (72 hours)    Negative: Fever returns after gone for over 24 hours and symptoms worse or not improved    Negative: Using nasal washes and pain medicine > 24 hours and sinus pain persists    Negative: Known COPD or other severe lung disease (i.e., bronchiectasis, cystic fibrosis, lung surgery) and worsening symptoms (i.e., increased sputum purulence or amount, increased breathing difficulty)    Protocols used: COUGH-A-OH

## 2022-12-16 ENCOUNTER — VIRTUAL VISIT (OUTPATIENT)
Dept: INTERNAL MEDICINE | Facility: CLINIC | Age: 77
End: 2022-12-16
Payer: COMMERCIAL

## 2022-12-16 DIAGNOSIS — J45.20 MILD INTERMITTENT ASTHMA WITHOUT COMPLICATION: ICD-10-CM

## 2022-12-16 DIAGNOSIS — J40 BRONCHITIS: Primary | ICD-10-CM

## 2022-12-16 PROCEDURE — 99214 OFFICE O/P EST MOD 30 MIN: CPT | Mod: 95 | Performed by: INTERNAL MEDICINE

## 2022-12-16 RX ORDER — AZITHROMYCIN 250 MG/1
TABLET, FILM COATED ORAL
Qty: 6 TABLET | Refills: 0 | Status: SHIPPED | OUTPATIENT
Start: 2022-12-16 | End: 2022-12-21

## 2022-12-16 RX ORDER — GUAIFENESIN/DEXTROMETHORPHAN 100-10MG/5
10 SYRUP ORAL EVERY 4 HOURS PRN
Qty: 473 ML | Refills: 0 | Status: SHIPPED | OUTPATIENT
Start: 2022-12-16 | End: 2023-07-03

## 2022-12-16 RX ORDER — ALBUTEROL SULFATE 90 UG/1
1-2 AEROSOL, METERED RESPIRATORY (INHALATION) EVERY 4 HOURS PRN
Qty: 18 G | Refills: 1 | Status: SHIPPED | OUTPATIENT
Start: 2022-12-16 | End: 2024-09-19

## 2022-12-16 NOTE — PROGRESS NOTES
Natasha is a 77 year old who is being evaluated via a billable video visit.      How would you like to obtain your AVS? MyChart  If the video visit is dropped, the invitation should be resent by: Text to Land line: [phone]  Will anyone else be joining your video visit? Yes: DAUGHTER-same number . How would they like to receive their invitation? Landline #[phone]    Assessment & Plan   Bronchitis  Symptoms persistent. Immunosuppressed. I favor treating with antibiotics and she was in agreement. Cough syrup rx'd to help with symptoms as well.  - azithromycin (ZITHROMAX) 250 MG tablet; Take 2 tablets (500 mg) by mouth daily for 1 day, THEN 1 tablet (250 mg) daily for 4 days. Take 2 tablets (500 mg) by mouth daily for 1 day, THEN 1 tablet (250 mg) daily for 4 days.  - guaiFENesin-dextromethorphan (ROBITUSSIN DM) 100-10 MG/5ML syrup; Take 10 mLs by mouth every 4 hours as needed for cough    Mild intermittent asthma without complication  Refilled chronic inhaler per request. No wheezing. Do not feel this is acute exacerbation.  - albuterol (PROAIR HFA/PROVENTIL HFA/VENTOLIN HFA) 108 (90 Base) MCG/ACT inhaler; Inhale 1-2 puffs into the lungs every 4 hours as needed for wheezing    F/u with regular PCP at regular interval or sooner PRN    Vamsi More MD  [facility]   Natasha is a 77 year old accompanied by her daughter who presents for a same day acute care virtual video visit with chief concern of:  Cough (Took COVID test on 12/15/22. Results were NEGATIVE )  This is the first time I have met Natasha.    HPI   Acute Illness  Acute illness concerns: cough  Onset/Duration: 6 days ago   Symptoms:  Fever: No  Chills/Sweats: YES  Headache (location?): YES  Sinus Pressure: YES- right side of cheek bone area   Conjunctivitis:  No  Ear Pain: no  Rhinorrhea: YES  Congestion: YES- a little chest congestion   Sore Throat: No  Cough: YES-productive of green sputum, with shortness  of breath  Wheeze: YES- sometimes at night when patient is laying down.   Decreased Appetite: No  Nausea: No  Vomiting: No  Diarrhea: No  Dysuria/Freq.: No  Dysuria or Hematuria: No  Fatigue/Achiness: YES- fatigue   Sick/Strep Exposure: No  Therapies tried and outcome: mucinex D and allegra-some relief-stops the runny nose.     Review of Systems   Constitutional, HEENT, cardiovascular, pulmonary, gi and gu systems are negative, except as otherwise noted.      Objective    Vitals - Patient Reported  Weight (Patient Reported): 101.2 kg (223 lb)  Pain Score: Severe Pain (6)  Pain Loc: Abdomen  Vitals: No vitals were obtained today due to virtual visit.    Physical Exam   GENERAL: Healthy, alert and no distress  EYES: Eyes grossly normal to inspection.  No discharge or erythema, or obvious scleral/conjunctival abnormalities.  RESP: No audible wheeze, cough, or visible cyanosis.  No visible retractions or increased work of breathing.    SKIN: Visible skin clear. No significant rash, abnormal pigmentation or lesions.  NEURO: Cranial nerves grossly intact.  Mentation and speech appropriate for age.  PSYCH: Mentation appears normal, affect normal/bright, judgement and insight intact, normal speech and appearance well-groomed.    Video-Visit Details  Video Start Time: 3:03 PM    Type of service: Video Visit    Video End Time: 3:10 PM    Originating Location (pt. Location): Home    Distant Location (provider location):  Off-site    Platform used for Video Visit: uberVU

## 2023-01-10 ENCOUNTER — VIRTUAL VISIT (OUTPATIENT)
Dept: FAMILY MEDICINE | Facility: CLINIC | Age: 78
End: 2023-01-10
Payer: COMMERCIAL

## 2023-01-10 DIAGNOSIS — U07.1 INFECTION DUE TO 2019 NOVEL CORONAVIRUS: Primary | ICD-10-CM

## 2023-01-10 DIAGNOSIS — R05.1 ACUTE COUGH: ICD-10-CM

## 2023-01-10 PROCEDURE — 99441 PR PHYSICIAN TELEPHONE EVALUATION 5-10 MIN: CPT | Mod: CS | Performed by: PHYSICIAN ASSISTANT

## 2023-01-10 ASSESSMENT — ASTHMA QUESTIONNAIRES: ACT_TOTALSCORE: 21

## 2023-01-10 NOTE — PROGRESS NOTES
Natasha is a 77 year old who is being evaluated via a billable telephone visit.      What phone number would you like to be contacted at? [phone]  How would you like to obtain your AVS? Chrisst    Distant Location (provider location):  On-site        Subjective   Natasha is a 77 year old presenting for the following health issues:  Covid Concern    Pos covid test yesterday  Has sore throat and dry cough, but no fever/chills  Pt is immunocompromised/has UC   She is on norvasc which does have an interaction with paxlovid  dtr has covid      HPI       COVID-19 Symptom Review  How many days ago did these symptoms start? 01/09/2023    Are any of the following symptoms significant for you?  New or worsening difficulty breathing? Yes    Please describe what kind of difficulty you are having breathing:Mild dyspnea (able to do ADLs without difficulty, mild shortness of breath with activities such as climbing one or two flights of stairs or walking briskly)    Worsening cough? Yes, it's a dry cough.     Fever or chills? No    Headache: No    Sore throat: YES    Chest pain: No    Diarrhea: No    Body aches? No    What treatments has patient tried? Guaifenesin (mucinex)   Does patient live in a nursing home, group home, or shelter? No  Does patient have a way to get food/medications during quarantined? Yes, I have a friend or family member who can help me.                  Review of Systems         Objective           Vitals:  No vitals were obtained today due to virtual visit.    Physical Exam   healthy, alert and no distress  PSYCH: Alert and oriented times 3; coherent speech, normal   rate and volume, able to articulate logical thoughts, able   to abstract reason, no tangential thoughts, no hallucinations   or delusions  Her affect is normal  RESP: No cough, no audible wheezing, able to talk in full sentences  Remainder of exam unable to be completed due to telephone visits      Assessment and Plan:     (U07.1) Infection  due to 2019 novel coronavirus  (primary encounter diagnosis)  Comment: pt advised to decr dose of norvasc by 50% while on paxlovid.  She can break her pills in half.  She had bronchitis last month and was treated with Zpack (see visit notes from 12/16/22)  Plan: nirmatrelvir and ritonavir (PAXLOVID) therapy         Pack  Pt to quarantine for 5 days and mask for 10 days            (R05.1) Acute cough  Comment:   Plan: she is using mucinex      Nicol Osman PA-C              Phone call duration: 10 minutes

## 2023-04-04 ENCOUNTER — TRANSFERRED RECORDS (OUTPATIENT)
Dept: HEALTH INFORMATION MANAGEMENT | Facility: CLINIC | Age: 78
End: 2023-04-04
Payer: COMMERCIAL

## 2023-04-04 LAB
ALT SERPL-CCNC: 20 IU/L (ref 0–32)
AST SERPL-CCNC: 43 IU/L (ref 0–40)
CREATININE (EXTERNAL): 0.73 MG/DL (ref 0.57–1)
GFR ESTIMATED (EXTERNAL): 85 ML/MIN/1.73
GLUCOSE (EXTERNAL): 117 MG/DL (ref 70–99)
POTASSIUM (EXTERNAL): 4.7 MMOL/L (ref 3.5–5.2)

## 2023-04-07 ENCOUNTER — HOSPITAL ENCOUNTER (OUTPATIENT)
Dept: CT IMAGING | Facility: HOSPITAL | Age: 78
Discharge: HOME | End: 2023-04-07
Attending: PHYSICIAN ASSISTANT | Admitting: PHYSICIAN ASSISTANT
Payer: COMMERCIAL

## 2023-04-07 DIAGNOSIS — K52.3 INDETERMINATE COLITIS: ICD-10-CM

## 2023-04-07 LAB
CREAT BLD-MCNC: 0.7 MG/DL (ref 0.6–1.1)
GFR SERPL CREATININE-BSD FRML MDRD: >60 ML/MIN/1.73M2

## 2023-04-07 PROCEDURE — 82565 ASSAY OF CREATININE: CPT

## 2023-04-07 PROCEDURE — 250N000011 HC RX IP 250 OP 636: Performed by: PHYSICIAN ASSISTANT

## 2023-04-07 PROCEDURE — 74177 CT ABD & PELVIS W/CONTRAST: CPT

## 2023-04-07 RX ORDER — IOPAMIDOL 755 MG/ML
90 INJECTION, SOLUTION INTRAVASCULAR ONCE
Status: COMPLETED | OUTPATIENT
Start: 2023-04-07 | End: 2023-04-07

## 2023-04-07 RX ADMIN — IOPAMIDOL 90 ML: 755 INJECTION, SOLUTION INTRAVENOUS at 08:30

## 2023-07-03 ENCOUNTER — OFFICE VISIT (OUTPATIENT)
Dept: FAMILY MEDICINE | Facility: CLINIC | Age: 78
End: 2023-07-03
Payer: COMMERCIAL

## 2023-07-03 ENCOUNTER — NURSE TRIAGE (OUTPATIENT)
Dept: NURSING | Facility: CLINIC | Age: 78
End: 2023-07-03

## 2023-07-03 VITALS
BODY MASS INDEX: 42.9 KG/M2 | DIASTOLIC BLOOD PRESSURE: 60 MMHG | TEMPERATURE: 98.9 F | HEART RATE: 58 BPM | SYSTOLIC BLOOD PRESSURE: 125 MMHG | RESPIRATION RATE: 18 BRPM | WEIGHT: 218.5 LBS | OXYGEN SATURATION: 96 % | HEIGHT: 60 IN

## 2023-07-03 DIAGNOSIS — K51.30 ULCERATIVE RECTOSIGMOIDITIS WITHOUT COMPLICATION (H): ICD-10-CM

## 2023-07-03 DIAGNOSIS — K86.1 IDIOPATHIC CHRONIC PANCREATITIS (H): ICD-10-CM

## 2023-07-03 DIAGNOSIS — E66.01 MORBID OBESITY (H): ICD-10-CM

## 2023-07-03 DIAGNOSIS — B02.9 HERPES ZOSTER WITHOUT COMPLICATION: Primary | ICD-10-CM

## 2023-07-03 PROCEDURE — 99214 OFFICE O/P EST MOD 30 MIN: CPT | Performed by: NURSE PRACTITIONER

## 2023-07-03 RX ORDER — VALACYCLOVIR HYDROCHLORIDE 1 G/1
1000 TABLET, FILM COATED ORAL 3 TIMES DAILY
Qty: 21 TABLET | Refills: 0 | Status: SHIPPED | OUTPATIENT
Start: 2023-07-03 | End: 2023-07-10

## 2023-07-03 ASSESSMENT — ASTHMA QUESTIONNAIRES: ACT_TOTALSCORE: 23

## 2023-07-03 NOTE — TELEPHONE ENCOUNTER
Nurse Triage SBAR    Is this a 2nd Level Triage? YES, LICENSED PRACTITIONER REVIEW IS REQUIRED    Situation: Patient calling with acute onset rash.    Background: Patient reporting rash that started 6/30/23. Rash located R side of neck into scalp. Patient described it as red, with raised vesicles. She stated she scratched the area and had some drainage which she was able to stop. That area is slightly swollen. She states that the rash area is not really painful, but does itch. She had been encouraged to receive a shingle vaccine dose, but had not done so, yet. She was actually intending to do that this week, but RN encouraged her to hold on that until seen by provider.    Assessment: Symptoms suspicious for shingles.    Protocol Recommended Disposition:   Go To ED/UCC Now (Or To Office With PCP Approval)    Recommendation: Recommendation for patient to be seen in clinic today. Appointment option available and scheduled.     Appointment scheduled.    Does the patient meet one of the following criteria for ADS visit consideration? 16+ years old, with an [facility] PCP     TIP  Providers, please consider if this condition is appropriate for management at one of our Acute and Diagnostic Services sites.     If patient is a good candidate, please use dotphrase <dot>triageresponse and select Refer to ADS to document.     Reason for Disposition    Shingles rash and spots start appearing other places on body    Additional Information    Negative: Difficult to awaken or acting confused (e.g., disoriented, slurred speech)    Negative: Sounds like a life-threatening emergency to the triager    Negative: Localized rash and doesn't match the SYMPTOMS of shingles    Negative: Back pain and doesn't match the SYMPTOMS of shingles    Negative: Shingles Vaccine (Recombinant Zoster Vaccine; RZV; Shingrix), questions about    Negative: Shingles rash of face and eye pain or blurred vision    Negative: Shingles rash on the eyelid or tip of the  nose    Protocols used: EDVIN Mari RN, BSN, MSN  FNA Triage 10:01 AM

## 2023-07-03 NOTE — PROGRESS NOTES
Assessment & Plan     Herpes zoster without complication  sxs along C3 dermatome  Given hx  Will rx valtrex   Discussed getting shingrix vaccine x 2 doses once healed  discussed how to take medication  Stay away from immunocompromised, pregnant women and young children  [facility]se [facility] care - handout given  Call if infection, symptoms near eyes or change in vision   - valACYclovir (VALTREX) 1000 mg tablet  Dispense: 21 tablet; Refill: 0    Ulcerative rectosigmoiditis without complication (H)  Continue medication daily   Stay away from trigger foods  Discussed reduction in stress  Stay up to date on colonoscopy - next due in 1 year  Followed by [facility]     Idiopathic chronic pancreatitis (H)  Continue f/u with [facility]  No ETOH or fried/fatty/greasy foods.   Increase hydration  Pain managed today without medication    Morbid obesity (H)  Discussed diet and exercise  applauded weight loss since last visit.   F/u with PCP for weight management plan          BMI:   Estimated body mass index is 42.67 kg/m  as calculated from the following:    Height as of this encounter: 1.524 m (5').    Weight as of this encounter: 99.1 kg (218 lb 8 oz).   Weight management plan: Discussed healthy diet and exercise guidelines Patient was referred to their PCP to discuss a diet and exercise plan.        WENDY Hurt CNP [facility]    Beth Kaur is a 77 year old, presenting for the following health issues:  Shingles (To found out if she have shingles )        7/3/2023     1:30 PM   Additional Questions   Roomed by SISI LOPEZ     History of Present Illness       Reason for visit:  Shingles    She eats 2-3 servings of fruits and vegetables daily.She consumes 1 sweetened beverage(s) daily.She exercises with enough effort to increase her heart rate 20 to 29 minutes per day.  She exercises with enough effort to increase her heart rate 4 days per week.   She is taking medications regularly.     Rash  started on Friday - felt tender and thought it had to do with ear rings. Took the ear rings out and used peroxide on the ear and didn't get better.     Feels in hairline and behind the ear, no other lesions anywhere else, tingling, and slightly itchy, but purposefully not itching, drainage clear serous fluid. It had blistered and not longer. Denies vision changes, facial pain.     Pain manageable with PRN tylenol. Not very painful at this time    A couple months ago did imaging CT scan on April 7th  - for chronic pancreatitis - occasional pain, no ETOH, stays away from greasy foods.  Followed by [facility]   Denies n/v/d    colonoscopy due in 1.5 years. Sigmoid ulcerative colitis - mesalamine daily -  recently passed (medical conditions and hospice, lost  last year), has been very stressed the past year. Followed by [facility]   Denies BRBPR, black stools, fatigue, weakness.     Hx of cholecystectomy               Review of Systems   Constitutional, HEENT, cardiovascular, pulmonary, gi and gu systems are negative, except as otherwise noted.      Objective    /60 (BP Location: Right arm, Patient Position: Sitting, Cuff Size: Adult Large)   Pulse 58   Temp 98.9  F (37.2  C) (Oral)   Resp 18   Ht 1.524 m (5')   Wt 99.1 kg (218 lb 8 oz)   SpO2 96%   BMI 42.67 kg/m    Body mass index is 42.67 kg/m .  Physical Exam   No rash on face or rest of body

## 2023-10-17 ENCOUNTER — OFFICE VISIT (OUTPATIENT)
Dept: FAMILY MEDICINE | Facility: CLINIC | Age: 78
End: 2023-10-17
Payer: COMMERCIAL

## 2023-10-17 VITALS
BODY MASS INDEX: 43.22 KG/M2 | WEIGHT: 214.4 LBS | OXYGEN SATURATION: 96 % | RESPIRATION RATE: 12 BRPM | TEMPERATURE: 98 F | HEIGHT: 59 IN | DIASTOLIC BLOOD PRESSURE: 78 MMHG | SYSTOLIC BLOOD PRESSURE: 142 MMHG | HEART RATE: 54 BPM

## 2023-10-17 DIAGNOSIS — K86.1 CHRONIC RECURRENT PANCREATITIS (H): ICD-10-CM

## 2023-10-17 DIAGNOSIS — R42 LIGHT-HEADED FEELING: ICD-10-CM

## 2023-10-17 DIAGNOSIS — I10 BENIGN ESSENTIAL HYPERTENSION: Primary | ICD-10-CM

## 2023-10-17 LAB
ALBUMIN SERPL BCG-MCNC: 4.2 G/DL (ref 3.5–5.2)
ALBUMIN UR-MCNC: NEGATIVE MG/DL
ALP SERPL-CCNC: 98 U/L (ref 35–104)
ALT SERPL W P-5'-P-CCNC: 15 U/L (ref 0–50)
ANION GAP SERPL CALCULATED.3IONS-SCNC: 8 MMOL/L (ref 7–15)
APPEARANCE UR: CLEAR
AST SERPL W P-5'-P-CCNC: 54 U/L (ref 0–45)
BACTERIA #/AREA URNS HPF: ABNORMAL /HPF
BILIRUB SERPL-MCNC: 0.7 MG/DL
BILIRUB UR QL STRIP: NEGATIVE
BUN SERPL-MCNC: 13.4 MG/DL (ref 8–23)
CALCIUM SERPL-MCNC: 10.2 MG/DL (ref 8.8–10.2)
CHLORIDE SERPL-SCNC: 104 MMOL/L (ref 98–107)
COLOR UR AUTO: YELLOW
CREAT SERPL-MCNC: 0.64 MG/DL (ref 0.51–0.95)
DEPRECATED HCO3 PLAS-SCNC: 28 MMOL/L (ref 22–29)
EGFRCR SERPLBLD CKD-EPI 2021: 90 ML/MIN/1.73M2
ERYTHROCYTE [DISTWIDTH] IN BLOOD BY AUTOMATED COUNT: 11.8 % (ref 10–15)
GLUCOSE SERPL-MCNC: 104 MG/DL (ref 70–99)
GLUCOSE UR STRIP-MCNC: NEGATIVE MG/DL
HCT VFR BLD AUTO: 45 % (ref 35–47)
HGB BLD-MCNC: 15.4 G/DL (ref 11.7–15.7)
HGB UR QL STRIP: NEGATIVE
KETONES UR STRIP-MCNC: NEGATIVE MG/DL
LEUKOCYTE ESTERASE UR QL STRIP: ABNORMAL
MCH RBC QN AUTO: 31.1 PG (ref 26.5–33)
MCHC RBC AUTO-ENTMCNC: 34.2 G/DL (ref 31.5–36.5)
MCV RBC AUTO: 91 FL (ref 78–100)
NITRATE UR QL: NEGATIVE
PH UR STRIP: 7 [PH] (ref 5–8)
PLATELET # BLD AUTO: 429 10E3/UL (ref 150–450)
POTASSIUM SERPL-SCNC: 3.8 MMOL/L (ref 3.4–5.3)
PROT SERPL-MCNC: 7.7 G/DL (ref 6.4–8.3)
RBC # BLD AUTO: 4.95 10E6/UL (ref 3.8–5.2)
RBC #/AREA URNS AUTO: ABNORMAL /HPF
SODIUM SERPL-SCNC: 140 MMOL/L (ref 135–145)
SP GR UR STRIP: 1.01 (ref 1–1.03)
SQUAMOUS #/AREA URNS AUTO: ABNORMAL /LPF
TRANS CELLS #/AREA URNS HPF: ABNORMAL /HPF
UROBILINOGEN UR STRIP-ACNC: 0.2 E.U./DL
VIT D+METAB SERPL-MCNC: 35 NG/ML (ref 20–50)
WBC # BLD AUTO: 7.8 10E3/UL (ref 4–11)
WBC #/AREA URNS AUTO: ABNORMAL /HPF

## 2023-10-17 PROCEDURE — 82306 VITAMIN D 25 HYDROXY: CPT | Performed by: FAMILY MEDICINE

## 2023-10-17 PROCEDURE — 87086 URINE CULTURE/COLONY COUNT: CPT | Performed by: FAMILY MEDICINE

## 2023-10-17 PROCEDURE — 80053 COMPREHEN METABOLIC PANEL: CPT | Performed by: FAMILY MEDICINE

## 2023-10-17 PROCEDURE — 85027 COMPLETE CBC AUTOMATED: CPT | Performed by: FAMILY MEDICINE

## 2023-10-17 PROCEDURE — 99214 OFFICE O/P EST MOD 30 MIN: CPT | Performed by: FAMILY MEDICINE

## 2023-10-17 PROCEDURE — 81001 URINALYSIS AUTO W/SCOPE: CPT | Performed by: FAMILY MEDICINE

## 2023-10-17 PROCEDURE — 36415 COLL VENOUS BLD VENIPUNCTURE: CPT | Performed by: FAMILY MEDICINE

## 2023-10-17 ASSESSMENT — PAIN SCALES - GENERAL: PAINLEVEL: NO PAIN (0)

## 2023-10-17 NOTE — COMMUNITY RESOURCES LIST (ENGLISH)
10/17/2023   [facility] - Outpatient Clinics  N/A  For additional resource needs, please contact your health insurance member services or your primary care team.  Phone: [phone]   Email: N/A   Address: [address] [place] [address]   Hours: N/A        Transportation       Free or low-cost transportation  1  [employer] Bus Loop - Free or low-cost transportation Distance: 3.53 miles      In-Person   [address] [place] [address]  Language: English  Hours: Mon - Fri 9:00 AM - 5:00 PM  Fees: Free   Phone: (919) 196-4156 Email: info@[employer] Website: https://www.[employer]/     2  Small [employer] Distance: 6.56 miles      In-Person   [address] [place] [address]  Language: English, Romanian  Hours: Mon 9:00 AM - 5:00 PM , Tue 9:30 AM - 7:00 PM , Wed 9:00 AM - 5:00 PM , Thu 9:30 AM - 7:00 PM , Fri 9:00 AM - 5:00 PM  Fees: Free   Phone: (630) 323-8778 Email: contactus@[employer] Website: http://www.Euroffice     Transportation to medical appointments  3  Discover Ride Distance: 1.08 miles      In-Person   [address] [address]  Language: English  Hours: Mon - Thu 6:00 AM - 6:00 PM , Fri 6:00 AM - 5:00 PM  Fees: Insurance, Self Pay   Phone: (235) 556-1622 Email: office@[employer] Website: https://www.[employer]/     4  [facility] - Non-Emergency Medical Transportation Distance: 5.38 miles      In-Person   [address] [place] [address]  Language: English  Hours: Mon - Fri 8:00 AM - 4:00 PM Appt. Only  Fees: Self Pay   Phone: (949) 965-4303 Website: http://www.allMusic Cave Studioshealth.org/Medical-Services/Emergency-medical-services/Non-emergency-transportation/          Important Numbers & Websites       [address].org  Poison Control   (582) 399-2084 [place]po[place]  Suicide and Crisis Lifeline   988 988[facility] Centerline.org  Childhelp [place] Child Abuse Hotline   976.949.1777  Childhelphotline.org  National Sexual Assault Hotline   (591) 400-1382 (HOPE) Rainn.org  National Runaway Safeline   (895) 338-8012 (RUNAWAY) 1800runaway.org  Pregnancy & Postpartum Support [place]   Call/text 155-602-1353 Ppsupportmn.org  Substance Abuse National Helpline ([facility]   826-452-HELP (1370) Findtreatment.gov  Emergency Services   918

## 2023-10-17 NOTE — COMMUNITY RESOURCES LIST (ENGLISH)
10/17/2023   [facility] - Outpatient Clinics  N/A  For additional resource needs, please contact your health insurance member services or your primary care team.  Phone: [phone]   Email: N/A   Address: [address] [place] [address]   Hours: N/A        Transportation       Free or low-cost transportation  1  [employer] Bus Loop - Free or low-cost transportation Distance: 3.53 miles      In-Person   [address] [place] [address]  Language: English  Hours: Mon - Fri 9:00 AM - 5:00 PM  Fees: Free   Phone: (484) 637-3267 Email: info@On[employer] Website: https://www.Onzo/     2  Small [employer] Distance: 6.56 miles      In-Person   [address] [place] [address]  Language: English, English  Hours: Mon 9:00 AM - 5:00 PM , Tue 9:30 AM - 7:00 PM , Wed 9:00 AM - 5:00 PM , Thu 9:30 AM - 7:00 PM , Fri 9:00 AM - 5:00 PM  Fees: Free   Phone: (500) 776-6912 Email: contactus@[employer] Website: http://www.Brandtree     Transportation to medical appointments  3  Discover Ride Distance: 1.08 miles      In-Person   [address] [address]  Language: English  Hours: Mon - Thu 6:00 AM - 6:00 PM , Fri 6:00 AM - 5:00 PM  Fees: Insurance, Self Pay   Phone: (702) 203-4924 Email: office@REHAPP Website: https://www.REHAPP/     4  [facility] - Non-Emergency Medical Transportation Distance: 5.38 miles      In-Person   [address] [place] [address]  Language: English  Hours: Mon - Fri 8:00 AM - 4:00 PM Appt. Only  Fees: Self Pay   Phone: (166) 859-9305 Website: http://www.allInsightfulinchealth.org/Medical-Services/Emergency-medical-services/Non-emergency-transportation/          Important Numbers & Websites       [address].org  Poison Control   (554) 709-9516 [place]poison.[place]  Suicide and Crisis Lifeline   988 988[facility] Centerline.org  Childhelp [place] Child Abuse Hotline   746.808.1643  Childhelphotline.org  National Sexual Assault Hotline   (873) 713-9434 (HOPE) Rainn.org  National Runaway Safeline   (810) 422-2263 (RUNAWAY) 1800runaway.org  Pregnancy & Postpartum Support [place]   Call/text 796-909-9245 Ppsupportmn.org  Substance Abuse National Helpline ([facility]   262-434-HELP (2717) Findtreatment.gov  Emergency Services   918

## 2023-10-17 NOTE — PROGRESS NOTES
Answers submitted by the patient for this visit:  General Questionnaire (Submitted on 10/17/2023)  Chief Complaint: Chronic problems general questions HPI Form  What is the reason for your visit today? : dizzy  How many servings of fruits and vegetables do you eat daily?: 0-1  On average, how many sweetened beverages do you drink each day (Examples: soda, juice, sweet tea, etc.  Do NOT count diet or artificially sweetened beverages)?: 0  How many minutes a day do you exercise enough to make your heart beat faster?: 9 or less  How many days a week do you exercise enough to make your heart beat faster?: 3 or less  How many days per week do you miss taking your medication?: 0

## 2023-10-17 NOTE — PROGRESS NOTES
Assessment & Plan     Benign essential hypertension    - CBC with platelets; Future  - Comprehensive metabolic panel; Future  - UA Macroscopic with reflex to Microscopic and Culture; Future  - CBC with platelets  - Comprehensive metabolic panel  - UA Macroscopic with reflex to Microscopic and Culture  - Urine Microscopic Exam  - Urine Culture    Chronic recurrent pancreatitis (H)    - CBC with platelets; Future  - Comprehensive metabolic panel; Future  - Vitamin D deficiency screening; Future  - CBC with platelets  - Comprehensive metabolic panel  - Vitamin D deficiency screening    Light-headed feeling    - Vitamin D deficiency screening; Future  - Vitamin D deficiency screening                 Niraj Biswas MD  [facility]CHARLIE Kaur is a 78 year old, presenting for the following health issues:  Hypertension (Soar spot in the head- having dizziness since for about off an on - )      10/17/2023    11:51 AM   Additional Questions   Roomed by luma HENDERSON patient presents for follow-up of her benign essential hypertension.  She is on amlodipine and carvedilol.  Blood pressure remains in the 130s over 70s as outpatient.  Reasonable follow-up in office confirms this reading.  She has been experiencing some lightheaded episodes but has not had any syncope or presyncopal episodes.  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  Its 1 year since she lost her  due to multiple old age issues.  She works 5 days a week 40 hours/week.  Her appetite is good but she does need a lot of green vegetables.  We encouraged her to try to change her diet and do that.  Fortunately her pancreatitis which has necessitated some hospitalizations in the past has been quiet and under good control with her mesalamine.  She has a small epithelial cyst on the back of her head which is not concerning.  Plan is to do a hemogram and metabolic work-up to check  "her potassium for the lightheadedness.  All medical questions asked were answered we should see her on a 6-month basis overall she is doing quite well  SUBJECTIVE:   CC: Natasha is an 78 year old who presents for preventative health visit.       10/17/2023    11:51 AM   Additional Questions   Roomed by luma       @[facility]                    Social History     Tobacco Use     Smoking status: Former     Types: Cigarettes     Quit date: 1997     Years since quittin.8     Smokeless tobacco: Never   Substance Use Topics     Alcohol use: No     Comment: Alcoholic Drinks/day: very rre social drink             2022     7:19 AM   Alcohol Use   Prescreen: >3 drinks/day or >7 drinks/week? No       Last PSA: No results found for: \"PSA\"    Reviewed orders with patient. Reviewed health maintenance and updated orders accordingly - Yes  Lab work is in process    Reviewed and updated as needed this visit by clinical staff   Tobacco  Allergies  Meds              Reviewed and updated as needed this visit by Provider                     [unfilled]  CONSTITUTIONAL: NEGATIVE for fever, chills, change in weight  INTEGUMENTARY/SKIN: NEGATIVE for worrisome rashes, moles or lesions  EYES: NEGATIVE for vision changes or irritation  ENT: NEGATIVE for ear, mouth and throat problems  RESP: NEGATIVE for significant cough or SOB  CV: NEGATIVE for chest pain, palpitations or peripheral edema  GI: NEGATIVE for nausea, abdominal pain, heartburn, or change in bowel habits   male: negative for dysuria, hematuria, decreased urinary stream, erectile dysfunction, urethral discharge  MUSCULOSKELETAL: NEGATIVE for significant arthralgias or myalgia  NEURO: NEGATIVE for weakness, dizziness or paresthesias  PSYCHIATRIC: NEGATIVE for changes in mood or affect    OBJECTIVE:   BP (!) 142/78 (BP Location: Left arm, Patient Position: Sitting, Cuff Size: Adult Large)   Pulse 54   Temp 98  F (36.7  C) (Temporal)   Resp 12   Ht 1.5 m (4' " "11.06\")   Wt 97.3 kg (214 lb 6.4 oz)   SpO2 96%   BMI 43.22 kg/m      [unfilled]  GENERAL: healthy, alert and no distress  NECK: no adenopathy, no asymmetry, masses, or scars and thyroid normal to palpation  RESP: lungs clear to auscultation - no rales, rhonchi or wheezes  CV: regular rate and rhythm, normal S1 S2, no S3 or S4, no murmur, click or rub, no peripheral edema and peripheral pulses strong  ABDOMEN: soft, nontender, no hepatosplenomegaly, no masses and bowel sounds normal  MS: no gross musculoskeletal defects noted, no edema    Diagnostic Test Results:  Labs reviewed in Epic  none     ASSESSMENT/PLAN:       ICD-10-CM    1. Benign essential hypertension  I10 CBC with platelets     Comprehensive metabolic panel     UA Macroscopic with reflex to Microscopic and Culture     CBC with platelets     Comprehensive metabolic panel     UA Macroscopic with reflex to Microscopic and Culture     Urine Microscopic Exam     Urine Culture      2. Chronic recurrent pancreatitis (H)  K86.1 CBC with platelets     Comprehensive metabolic panel     Vitamin D deficiency screening     CBC with platelets     Comprehensive metabolic panel     Vitamin D deficiency screening      3. Light-headed feeling  R42 Vitamin D deficiency screening     Vitamin D deficiency screening                COUNSELING:   Reviewed preventive health counseling, as reflected in patient instructions       Healthy diet/nutrition        She reports that she quit smoking about 26 years ago. Her smoking use included cigarettes. She has never used smokeless tobacco.          Niraj Biswas MD  [facility]              Review of Systems   Constitutional, HEENT, cardiovascular, pulmonary, gi and gu systems are negative, except as otherwise noted.      Objective    BP (!) 142/78 (BP Location: Left arm, Patient Position: Sitting, Cuff Size: Adult Large)   Pulse 54   Temp 98  F (36.7  C) (Temporal)   Resp 12   Ht 1.5 m (4' 11.06\") "   Wt 97.3 kg (214 lb 6.4 oz)   SpO2 96%   BMI 43.22 kg/m    Body mass index is 43.22 kg/m .  Physical Exam   GENERAL: healthy, alert and no distress  NECK: no adenopathy, no asymmetry, masses, or scars and thyroid normal to palpation  RESP: lungs clear to auscultation - no rales, rhonchi or wheezes  CV: regular rate and rhythm, normal S1 S2, no S3 or S4, no murmur, click or rub, no peripheral edema and peripheral pulses strong  ABDOMEN: soft, nontender, no hepatosplenomegaly, no masses and bowel sounds normal  MS: no gross musculoskeletal defects noted, no edema

## 2023-10-19 LAB — BACTERIA UR CULT: NO GROWTH

## 2023-12-20 DIAGNOSIS — I10 BENIGN ESSENTIAL HYPERTENSION: Primary | ICD-10-CM

## 2023-12-20 RX ORDER — AMLODIPINE BESYLATE 5 MG/1
5 TABLET ORAL DAILY
Qty: 90 TABLET | Refills: 0 | Status: SHIPPED | OUTPATIENT
Start: 2023-12-20 | End: 2024-03-26

## 2024-01-13 ENCOUNTER — HEALTH MAINTENANCE LETTER (OUTPATIENT)
Age: 79
End: 2024-01-13

## 2024-03-06 ASSESSMENT — SOCIAL DETERMINANTS OF HEALTH (SDOH): HOW OFTEN DO YOU GET TOGETHER WITH FRIENDS OR RELATIVES?: ONCE A WEEK

## 2024-03-11 SDOH — HEALTH STABILITY: PHYSICAL HEALTH: ON AVERAGE, HOW MANY DAYS PER WEEK DO YOU ENGAGE IN MODERATE TO STRENUOUS EXERCISE (LIKE A BRISK WALK)?: 0 DAYS

## 2024-03-11 SDOH — HEALTH STABILITY: PHYSICAL HEALTH: ON AVERAGE, HOW MANY MINUTES DO YOU ENGAGE IN EXERCISE AT THIS LEVEL?: 0 MIN

## 2024-03-11 ASSESSMENT — ASTHMA QUESTIONNAIRES
QUESTION_2 LAST FOUR WEEKS HOW OFTEN HAVE YOU HAD SHORTNESS OF BREATH: NOT AT ALL
ACT_TOTALSCORE: 25
QUESTION_5 LAST FOUR WEEKS HOW WOULD YOU RATE YOUR ASTHMA CONTROL: COMPLETELY CONTROLLED
QUESTION_1 LAST FOUR WEEKS HOW MUCH OF THE TIME DID YOUR ASTHMA KEEP YOU FROM GETTING AS MUCH DONE AT WORK, SCHOOL OR AT HOME: NONE OF THE TIME
QUESTION_3 LAST FOUR WEEKS HOW OFTEN DID YOUR ASTHMA SYMPTOMS (WHEEZING, COUGHING, SHORTNESS OF BREATH, CHEST TIGHTNESS OR PAIN) WAKE YOU UP AT NIGHT OR EARLIER THAN USUAL IN THE MORNING: NOT AT ALL
QUESTION_4 LAST FOUR WEEKS HOW OFTEN HAVE YOU USED YOUR RESCUE INHALER OR NEBULIZER MEDICATION (SUCH AS ALBUTEROL): NOT AT ALL

## 2024-03-11 ASSESSMENT — SOCIAL DETERMINANTS OF HEALTH (SDOH): HOW OFTEN DO YOU GET TOGETHER WITH FRIENDS OR RELATIVES?: ONCE A WEEK

## 2024-03-11 NOTE — COMMUNITY RESOURCES LIST (ENGLISH)
03/11/2024   [facility] [employer]  N/A  For questions about this resource list or additional care needs, please contact your primary care clinic or care manager.  Phone: [phone]   Email: N/A   Address: [address] [place] [address]   Hours: N/A        Exercise and Recreation       Gym or workout facility  1  [facility] [place] [facility] Distance: 2.9 miles      In-Person   [address] [place] [address]  Language: English  Hours: Mon - Thu 9:00 AM - 9:00 PM , Fri 9:00 AM - 5:00 PM , Sat 10:00 AM - 5:00 PM , Sun 12:00 PM - 4:00 PM  Fees: Free, Self Pay   Phone: (681) 420-2514 Email: [email or web address]. Website: https://www.[facility]/facilities/[facility]     2  [place] [facility] Distance: 3.13 miles      In-Person   [address] [place] [address]  Language: English  Hours: Mon - Thu 9:00 AM - 9:00 PM , Fri 9:00 AM - 6:00 PM  Fees: Free, Self Pay   Phone: (272) 136-8023 Email: noel@[facility]. Website: https://www.[facility]/facilities/[facility]          Important Numbers & Websites       Emergency Services   911  [facility]   311  Poison Control   (131) 978-1171  Suicide Prevention Lifeline   (728) 972-7814 (TALK)  Child Abuse Hotline   (514) 190-7939 (4-A-Child)  Sexual Assault Hotline   (918) 904-3766 (HOPE)  National Runaway Safeline   (875) 305-2060 (RUNAWAY)  All-Options Talkline   (841) 754-3881  Substance Abuse Referral   (930) 965-2577 (HELP)

## 2024-03-12 PROBLEM — K59.09 CHRONIC CONSTIPATION: Status: ACTIVE | Noted: 2024-03-12

## 2024-03-14 ENCOUNTER — OFFICE VISIT (OUTPATIENT)
Dept: FAMILY MEDICINE | Facility: CLINIC | Age: 79
End: 2024-03-14
Payer: COMMERCIAL

## 2024-03-14 VITALS
HEART RATE: 59 BPM | WEIGHT: 204.4 LBS | TEMPERATURE: 98.6 F | OXYGEN SATURATION: 94 % | HEIGHT: 59 IN | BODY MASS INDEX: 41.2 KG/M2 | RESPIRATION RATE: 18 BRPM | DIASTOLIC BLOOD PRESSURE: 76 MMHG | SYSTOLIC BLOOD PRESSURE: 134 MMHG

## 2024-03-14 DIAGNOSIS — K59.09 CHRONIC CONSTIPATION: Primary | ICD-10-CM

## 2024-03-14 DIAGNOSIS — R10.12 LUQ ABDOMINAL PAIN: ICD-10-CM

## 2024-03-14 DIAGNOSIS — K44.9 HIATAL HERNIA: ICD-10-CM

## 2024-03-14 DIAGNOSIS — Z00.00 ENCOUNTER FOR ANNUAL WELLNESS EXAM IN MEDICARE PATIENT: ICD-10-CM

## 2024-03-14 LAB
ERYTHROCYTE [DISTWIDTH] IN BLOOD BY AUTOMATED COUNT: 12.4 % (ref 10–15)
HCT VFR BLD AUTO: 43 % (ref 35–47)
HGB BLD-MCNC: 14.4 G/DL (ref 11.7–15.7)
MCH RBC QN AUTO: 30.4 PG (ref 26.5–33)
MCHC RBC AUTO-ENTMCNC: 33.5 G/DL (ref 31.5–36.5)
MCV RBC AUTO: 91 FL (ref 78–100)
PLATELET # BLD AUTO: 469 10E3/UL (ref 150–450)
RBC # BLD AUTO: 4.74 10E6/UL (ref 3.8–5.2)
WBC # BLD AUTO: 10.1 10E3/UL (ref 4–11)

## 2024-03-14 PROCEDURE — 83735 ASSAY OF MAGNESIUM: CPT | Performed by: FAMILY MEDICINE

## 2024-03-14 PROCEDURE — 80053 COMPREHEN METABOLIC PANEL: CPT | Performed by: FAMILY MEDICINE

## 2024-03-14 PROCEDURE — 99397 PER PM REEVAL EST PAT 65+ YR: CPT | Performed by: FAMILY MEDICINE

## 2024-03-14 PROCEDURE — 82150 ASSAY OF AMYLASE: CPT | Performed by: FAMILY MEDICINE

## 2024-03-14 PROCEDURE — 99214 OFFICE O/P EST MOD 30 MIN: CPT | Mod: 25 | Performed by: FAMILY MEDICINE

## 2024-03-14 PROCEDURE — 36415 COLL VENOUS BLD VENIPUNCTURE: CPT | Performed by: FAMILY MEDICINE

## 2024-03-14 PROCEDURE — 85027 COMPLETE CBC AUTOMATED: CPT | Performed by: FAMILY MEDICINE

## 2024-03-14 SDOH — HEALTH STABILITY: PHYSICAL HEALTH: ON AVERAGE, HOW MANY DAYS PER WEEK DO YOU ENGAGE IN MODERATE TO STRENUOUS EXERCISE (LIKE A BRISK WALK)?: 0 DAYS

## 2024-03-14 SDOH — HEALTH STABILITY: PHYSICAL HEALTH: ON AVERAGE, HOW MANY MINUTES DO YOU ENGAGE IN EXERCISE AT THIS LEVEL?: 0 MIN

## 2024-03-14 ASSESSMENT — ACTIVITIES OF DAILY LIVING (ADL)
CHANGE_IN_FUNCTIONAL_STATUS_SINCE_ONSET_OF_CURRENT_ILLNESS/INJURY: NO
WEAR_GLASSES_OR_BLIND: YES
HEARING_DIFFICULTY_OR_DEAF: NO
FALL_HISTORY_WITHIN_LAST_SIX_MONTHS: NO
DIFFICULTY_EATING/SWALLOWING: NO
DRESSING/BATHING_DIFFICULTY: NO
WALKING_OR_CLIMBING_STAIRS_DIFFICULTY: NO
DOING_ERRANDS_INDEPENDENTLY_DIFFICULTY: NO
TOILETING_ISSUES: NO
CONCENTRATING,_REMEMBERING_OR_MAKING_DECISIONS_DIFFICULTY: NO
DIFFICULTY_COMMUNICATING: NO

## 2024-03-14 ASSESSMENT — ASTHMA QUESTIONNAIRES: ACT_TOTALSCORE: 25

## 2024-03-14 ASSESSMENT — PAIN SCALES - GENERAL: PAINLEVEL: NO PAIN (0)

## 2024-03-14 ASSESSMENT — SOCIAL DETERMINANTS OF HEALTH (SDOH): HOW OFTEN DO YOU GET TOGETHER WITH FRIENDS OR RELATIVES?: ONCE A WEEK

## 2024-03-14 NOTE — COMMUNITY RESOURCES LIST (ENGLISH)
March 14, 2024           YOUR PERSONALIZED LIST OF SERVICES & PROGRAMS           & RECREATION    Sports      [place] [employer] - Sports clubs and recreational activities  [address] [place] [address] (Distance: 3.0 miles)  Phone: (430) [phone]  Website: http://www.[employer]  Language: English  Fee: Self pay  Accessibility: Ada accessible, Translation services      [facility] - Adult Enrichment  Phone: (496) [phone]  Website: https://[employer]/adults-seniors/adult-enrichment/  Language: English  Hours: Mon 7:30 AM - 4:00 PM Tue 7:30 AM - 4:00 PM Wed 7:30 AM - 4:00 PM Thu 7:30 AM - 4:00 PM Fri 7:30 AM - 4:00 PM      [employer]ASEBALL AND SOFTBALL  Website: http://www.[employer].stylemarks    Classes/Groups      [facility] [employer] - Group Exercise  [address] [place] [address] (Distance: 10.7 miles)  Phone: ([phone]) [phone]  Website: https://www.[employer]/locations/[facility]/Kaike[employer].comverLoudeye/classes_programs  Language: English  Fee: Self pay      [facility] [employer] - [place]  [address] [place] [address] (Distance: 10.7 miles)  Phone: [phone]) [phone]  Website: https://www.[employer]/[facility]/foreverLoudeye/classes_programs  Language: English  Fee: Insurance      - Online and Local Fitness Classes  Phone: (201) 142-1274  Website: https://tools.Kii/Search/OnlineClasses  Language: English  Fee: Free               IMPORTANT NUMBERS & WEBSITES        Emergency Services  911  .   [employer]  211 http://211unitedway.org  .   Poison Control  (444) 577-3076 http://[place]poison.org http://[place]poison.org  .     Suicide and Crisis Lifeline  988 http://988lifeline.org  .   Childhelp National Child Abuse Hotline  795.968.9023 http://Childhelphotline.org   .   National Sexual Assault Hotline  (895) 725-6417 (HOPE) http://Rainn.org   .     National Runaway  Safeline  (413) 784-3413 (RUNAWAY) http://BioDetegoruJetlore.org  .   Pregnancy & Postpartum Support  Call/text 924-895-6918  [place]: http://ppsupportmn.org  [place]: http://psichapters.com/wi  .   Substance Abuse National Helpline ([facility])  796-862-HELP (5841) http://Findtreatment.gov   .                DISCLAIMER: [employer] does not endorse any service providers mentioned in this resource list. [employer] does not guarantee that the services mentioned in this resource list will be available to you or will improve your health or wellness.    [facility]

## 2024-03-14 NOTE — PROGRESS NOTES
"Preventive Care Visit  [facility]  Niraj Biswas MD, Family Medicine  Mar 14, 2024      Assessment & Plan     Chronic constipation  We will we will check the following parameters by want her to take some magnesium sulfate 250 mg daily  - Comprehensive metabolic panel (BMP + Alb, Alk Phos, ALT, AST, Total. Bili, TP); Future  - CBC with platelets; Future  - Magnesium; Future    Hiatal hernia  Await test results  - Comprehensive metabolic panel (BMP + Alb, Alk Phos, ALT, AST, Total. Bili, TP); Future  - CBC with platelets; Future  - Magnesium; Future    LUQ abdominal pain  Following will be ordered she will see Dr. Parmar here in May  - Amylase; Future              BMI  Estimated body mass index is 41.28 kg/m  as calculated from the following:    Height as of this encounter: 1.499 m (4' 11\").    Weight as of this encounter: 92.7 kg (204 lb 6.4 oz).   Weight management plan: Discussed healthy diet and exercise guidelines    Counseling  Appropriate preventive services were discussed with this patient, including applicable screening as appropriate for fall prevention, nutrition, physical activity, Tobacco-use cessation, weight loss and cognition.  Checklist reviewing preventive services available has been given to the patient.  Reviewed patient's diet, addressing concerns and/or questions.   The patient was instructed to see the dentist every 6 months.   Discussed possible causes of fatigue. Information on urinary incontinence and treatment options given to patient.       History of present illness and assessment and plan: Natasha presents today for management of her chronic abdominal pain constipation and her annual wellness; we are going to start her on some magnesium.  We will check her profile hemogram and amylase as she has had a history of pancreatitis; her plan was to see Dr. Garcia gastroenterologist in May we will renew her medications her blood pressure was fine her weight she is " gradually losing weight so were happy with that    Subjective   Natasha is a 78 year old, presenting for the following:  Recheck Medication, Wellness Visit, and Abdominal Pain        3/14/2024    11:47 AM   Additional Questions   Roomed by am cma        Health Care Directive  Patient does not have a Health Care Directive or Living Will: Advance Directive received and scanned. Click on Code in the patient header to view.    Do you have a current opioid prescription? no  Do you use any other controlled substances or medications that are not prescribed by a provider?  no              3/14/2024   General Health   How would you rate your overall physical health? (!) FAIR   Feel stress (tense, anxious, or unable to sleep) Not at all         3/14/2024   Nutrition   Diet: Regular (no restrictions)         3/14/2024   Exercise   Days per week of moderate/strenous exercise 0 days   Average minutes spent exercising at this level 0 min   (!) EXERCISE CONCERN      3/14/2024   Social Factors   Frequency of gathering with friends or relatives Once a week   Worry food won't last until get money to buy more No   Food not last or not have enough money for food? No   Do you have housing?  Yes   Are you worried about losing your housing? No   Lack of transportation? No   Unable to get utilities (heat,electricity)? No         3/14/2024   Activities of Daily Living- Home Safety   Needs help with the following daily activites None of the above   Safety concerns in the home None of the above         3/14/2024   Dental   Dentist two times every year? (!) NO         3/14/2024   Hearing Screening   Hearing concerns? None of the above         3/14/2024   Driving Risk Screening   Patient/family members have concerns about driving No         3/14/2024   General Alertness/Fatigue Screening   Have you been more tired than usual lately? (!) YES         3/14/2024   Urinary Incontinence Screening   Bothered by leaking urine in past 6 months Yes          3/6/2024   TB Screening   Were you born outside of [place]?  No         Today's PHQ-2 Score:       3/14/2024    11:44 AM   PHQ-2 (  [employer])   Q1: Little interest or pleasure in doing things 0   Q2: Feeling down, depressed or hopeless 0   PHQ-2 Score 0           3/14/2024   Substance Use   Alcohol more than 3/day or more than 7/wk Not Applicable   Do you have a current opioid prescription? No   How severe/bad is pain from 1 to 10? 7/10   Do you use any other substances recreationally? No     Social History     Tobacco Use    Smoking status: Former     Types: Cigarettes     Quit date: 1997     Years since quittin.2    Smokeless tobacco: Never   Vaping Use    Vaping Use: Never used   Substance Use Topics    Alcohol use: No     Comment: Alcoholic Drinks/day: very rre social drink    Drug use: No           2022   LAST FHS-7 RESULTS   1st degree relative breast or ovarian cancer No   Any relative bilateral breast cancer No   Any male have breast cancer No   Any ONE woman have BOTH breast AND ovarian cancer No   Any woman with breast cancer before 50yrs No   2 or more relatives with breast AND/OR ovarian cancer No   2 or more relatives with breast AND/OR bowel cancer No            ASCVD Risk   The 10-year ASCVD risk score (Dragan JARVIS, et al., 2019) is: 30%    Values used to calculate the score:      Age: 78 years      Sex: Female      Is Non- : No      Diabetic: No      Tobacco smoker: No      Systolic Blood Pressure: 134 mmHg      Is BP treated: Yes      HDL Cholesterol: 48 mg/dL      Total Cholesterol: 207 mg/dL            Reviewed and updated as needed this visit by Provider                    Lab work is in process  Current providers sharing in care for this patient include:  Patient Care Team:  Niraj Biswas MD as PCP - General  Niraj Biswas MD as Assigned PCP    The following health maintenance items are reviewed in Epic and correct as of today:  Health Maintenance  "  Topic Date Due    ASTHMA ACTION PLAN  Never done    HEPATITIS C SCREENING  Never done    RSV VACCINE (Pregnancy & 60+) (1 - 1-dose 60+ series) Never done    MEDICARE ANNUAL WELLNESS VISIT  12/08/2023    ANNUAL REVIEW OF HM ORDERS  07/03/2024    ASTHMA CONTROL TEST  09/14/2024    ADVANCE CARE PLANNING  09/25/2024    FALL RISK ASSESSMENT  03/14/2025    GLUCOSE  10/17/2026    DTAP/TDAP/TD IMMUNIZATION (2 - Td or Tdap) 02/06/2027    LIPID  12/08/2027    DEXA  08/31/2032    PHQ-2 (once per calendar year)  Completed    INFLUENZA VACCINE  Completed    Pneumococcal Vaccine: 65+ Years  Completed    ZOSTER IMMUNIZATION  Completed    COVID-19 Vaccine  Completed    IPV IMMUNIZATION  Aged Out    HPV IMMUNIZATION  Aged Out    MENINGITIS IMMUNIZATION  Aged Out    RSV MONOCLONAL ANTIBODY  Aged Out    MAMMO SCREENING  Discontinued    COLORECTAL CANCER SCREENING  Discontinued         Review of Systems  CONSTITUTIONAL: NEGATIVE for fever, chills, change in weight  INTEGUMENTARY/SKIN: NEGATIVE for worrisome rashes, moles or lesions  EYES: NEGATIVE for vision changes or irritation  ENT/MOUTH: NEGATIVE for ear, mouth and throat problems  RESP: NEGATIVE for significant cough or SOB  BREAST: NEGATIVE for masses, tenderness or discharge  CV: NEGATIVE for chest pain, palpitations or peripheral edema  GI: NEGATIVE for nausea, abdominal pain, heartburn, or change in bowel habits  : NEGATIVE for frequency, dysuria, or hematuria  MUSCULOSKELETAL: NEGATIVE for significant arthralgias or myalgia  NEURO: NEGATIVE for weakness, dizziness or paresthesias  ENDOCRINE: NEGATIVE for temperature intolerance, skin/hair changes  HEME: NEGATIVE for bleeding problems  PSYCHIATRIC: NEGATIVE for changes in mood or affect     Objective    Exam  /76   Pulse 59   Temp 98.6  F (37  C)   Resp 18   Ht 1.499 m (4' 11\")   Wt 92.7 kg (204 lb 6.4 oz)   LMP  (LMP Unknown)   SpO2 94%   BMI 41.28 kg/m     Estimated body mass index is 41.28 kg/m  as " "calculated from the following:    Height as of this encounter: 1.499 m (4' 11\").    Weight as of this encounter: 92.7 kg (204 lb 6.4 oz).    Physical Exam  GENERAL: alert and no distress  EYES: Eyes grossly normal to inspection, PERRL and conjunctivae and sclerae normal  HENT: ear canals and TM's normal, nose and mouth without ulcers or lesions  NECK: no adenopathy, no asymmetry, masses, or scars  RESP: lungs clear to auscultation - no rales, rhonchi or wheezes  CV: regular rate and rhythm, normal S1 S2, no S3 or S4, no murmur, click or rub, no peripheral edema  ABDOMEN: soft, nontender, no hepatosplenomegaly, no masses and bowel sounds normal  MS: no gross musculoskeletal defects noted, no edema  SKIN: no suspicious lesions or rashes  NEURO: Normal strength and tone, mentation intact and speech normal  PSYCH: mentation appears normal, affect normal/bright        3/14/2024   Mini Cog   Clock Draw Score 2 Normal   3 Item Recall 3 objects recalled   Mini Cog Total Score 5             Signed Electronically by: Niraj Biswas MD    "

## 2024-03-14 NOTE — COMMUNITY RESOURCES LIST (ENGLISH)
March 14, 2024           YOUR PERSONALIZED LIST OF SERVICES & PROGRAMS           & RECREATION    Sports      [employer] - Sports clubs and recreational activities  [address] [place] [address] (Distance: 3.0 miles)  Phone: [phone]  Website: http://[employer]  Language: English  Fee: Self pay  Accessibility: Ada accessible, Translation services      [employer] - LITTLE [employer]AG[employer] BASEBALL AND SOFTBALL  Website: http://www.Rally[employer]      [facility] - Adult Enrichment  Phone: [phone]  Website: https://Numerous/adults-seniors/adult-enrichment/  Language: English  Hours: Mon 7:30 AM - 4:00 PM Tue 7:30 AM - 4:00 PM Wed 7:30 AM - 4:00 PM Thu 7:30 AM - 4:00 PM Fri 7:30 AM - 4:00 PM    Classes/Groups      [facility] [employer] - Social[employer]ial  [address] [place] [address] (Distance: 10.7 miles)  Phone: [phone]  Website: [email or web address]/[facility]_[employer]/KryptiqverMgv/classes_programs  Language: English  Fee: Insurance      [facility] [employer] - Group Exercise  [address] [place] [address] (Distance: 10.7 miles)  Phone: [phone]  Website: [email or web address]/[facility]_[employer]/foreverwell/classes_programs  Language: English  Fee: Self pay      - Online and Local Fitness Classes  Phone: [phone]  Website: [email or web address]://[email or web address].uberall/Search/OnlineClasses  Language: English  Fee: Free               IMPORTANT NUMBERS & WEBSITES        Emergency Services  911  .   United Way  211 http://211unitedway.org  .   Poison Control  (284) 155-2420 http://[place]poison.org http://wis[place]poison.org  .     Suicide and Crisis Lifeline  988 http://988lifeline.org  .   Childhelp National Child Abuse Hotline  970.864.9936 http://Childhelphotline.org   .   National Sexual Assault Hotline  (987) 387-3998 (HOPE) http://Rainn.org   .     National Runaway  Safeline  (267) 686-2650 (RUNAWAY) http://[employer] SuperruCircle of Moms.org  .   Pregnancy & Postpartum Support  Call/text 789-912-1555  [place]: http://ppsupportmn.org  [place]: http://psichapters.com/wi  .   Substance Abuse National Helpline ([facility])  536-657-HELP (9577) http://Findtreatment.gov   .                DISCLAIMER: [employer] does not endorse any service providers mentioned in this resource list. [employer] does not guarantee that the services mentioned in this resource list will be available to you or will improve your health or wellness.    [facility]

## 2024-03-15 LAB
ALBUMIN SERPL BCG-MCNC: 4 G/DL (ref 3.5–5.2)
ALP SERPL-CCNC: 97 U/L (ref 40–150)
ALT SERPL W P-5'-P-CCNC: 17 U/L (ref 0–50)
AMYLASE SERPL-CCNC: 54 U/L (ref 28–100)
ANION GAP SERPL CALCULATED.3IONS-SCNC: 9 MMOL/L (ref 7–15)
AST SERPL W P-5'-P-CCNC: 52 U/L (ref 0–45)
BILIRUB SERPL-MCNC: 0.6 MG/DL
BUN SERPL-MCNC: 17.7 MG/DL (ref 8–23)
CALCIUM SERPL-MCNC: 10.4 MG/DL (ref 8.8–10.2)
CHLORIDE SERPL-SCNC: 104 MMOL/L (ref 98–107)
CREAT SERPL-MCNC: 0.7 MG/DL (ref 0.51–0.95)
DEPRECATED HCO3 PLAS-SCNC: 28 MMOL/L (ref 22–29)
EGFRCR SERPLBLD CKD-EPI 2021: 88 ML/MIN/1.73M2
GLUCOSE SERPL-MCNC: 100 MG/DL (ref 70–99)
MAGNESIUM SERPL-MCNC: 2.1 MG/DL (ref 1.7–2.3)
POTASSIUM SERPL-SCNC: 4.7 MMOL/L (ref 3.4–5.3)
PROT SERPL-MCNC: 7.2 G/DL (ref 6.4–8.3)
SODIUM SERPL-SCNC: 141 MMOL/L (ref 135–145)

## 2024-03-25 DIAGNOSIS — I10 BENIGN ESSENTIAL HYPERTENSION: ICD-10-CM

## 2024-03-26 RX ORDER — AMLODIPINE BESYLATE 5 MG/1
5 TABLET ORAL DAILY
Qty: 90 TABLET | Refills: 0 | Status: SHIPPED | OUTPATIENT
Start: 2024-03-26 | End: 2024-07-03

## 2024-04-12 NOTE — TELEPHONE ENCOUNTER
Give her 30 pills but needs to be seen   Pt's mother called in to keo an appt for recurrent ear infections, congestion, she stated the pt is having sleep apnea issues, and when mom is trying to brush the pt's teeth and tongue, the back of her throat will start to bleed, so she stopped brushing the tongue. She would like to know if the pt can be seen sooner than first avail? LOV: 10/18/19. Please, advise. Terri can be reached at [phone].

## 2024-05-07 ENCOUNTER — TRANSFERRED RECORDS (OUTPATIENT)
Dept: HEALTH INFORMATION MANAGEMENT | Facility: CLINIC | Age: 79
End: 2024-05-07
Payer: COMMERCIAL

## 2024-05-24 ENCOUNTER — TRANSFERRED RECORDS (OUTPATIENT)
Dept: HEALTH INFORMATION MANAGEMENT | Facility: CLINIC | Age: 79
End: 2024-05-24
Payer: COMMERCIAL

## 2024-05-24 LAB
ALT SERPL-CCNC: 14 IU/L (ref 0–32)
AST SERPL-CCNC: 52 IU/L (ref 0–40)
CREATININE (EXTERNAL): 0.61 MG/DL (ref 0.57–1)
GFR ESTIMATED (EXTERNAL): 91 ML/MIN/1.73
GLUCOSE (EXTERNAL): 73 MG/DL (ref 70–99)
POTASSIUM (EXTERNAL): 4.6 MMOL/L (ref 3.5–5.2)

## 2024-07-02 DIAGNOSIS — I10 BENIGN ESSENTIAL HYPERTENSION: ICD-10-CM

## 2024-07-03 RX ORDER — CARVEDILOL 12.5 MG/1
TABLET ORAL
Qty: 180 TABLET | Refills: 0 | Status: SHIPPED | OUTPATIENT
Start: 2024-07-03

## 2024-07-03 RX ORDER — AMLODIPINE BESYLATE 5 MG/1
5 TABLET ORAL DAILY
Qty: 90 TABLET | Refills: 1 | Status: SHIPPED | OUTPATIENT
Start: 2024-07-03

## 2024-07-03 NOTE — TELEPHONE ENCOUNTER
Patient returned call, patient states that sometimes since she works in the morning at 4 am she forgets to take the first dose because she gets home from work after 1pm and feels it is too late to take it since she has an afternoon dose to take as well.     Patient is still taking the amlodipine as well and BP has not been to elevated and not having any symptoms at this time.     Pt also stated that she had her CT scan done back in April of 2023 and the note she got was to have her PCP review and possibly follow up with an Ultrasound but she forgot to bring it up in the last visit and wants to know if Dr. Biswas would like for her to get that ultrasound completed.     Julian Germain RN  [facility]

## 2024-07-03 NOTE — TELEPHONE ENCOUNTER
Julian Germain RN called Natasha on 7/3 and left a message to return call to clinic. If patient calls back, please route to [facility].     TC please route to RN.     Julian Germain RN  [facility]

## 2024-07-09 ENCOUNTER — TRANSFERRED RECORDS (OUTPATIENT)
Dept: HEALTH INFORMATION MANAGEMENT | Facility: CLINIC | Age: 79
End: 2024-07-09
Payer: COMMERCIAL

## 2024-08-07 ENCOUNTER — HOSPITAL ENCOUNTER (OUTPATIENT)
Dept: CT IMAGING | Facility: HOSPITAL | Age: 79
Discharge: HOME | End: 2024-08-07
Attending: INTERNAL MEDICINE
Payer: COMMERCIAL

## 2024-08-07 DIAGNOSIS — K51.80 OTHER ULCERATIVE COLITIS WITHOUT COMPLICATION (H): ICD-10-CM

## 2024-08-07 DIAGNOSIS — K64.8 HEMORRHOIDS, INTERNAL: ICD-10-CM

## 2024-08-07 DIAGNOSIS — K57.30 DVRTCLOS OF LG INT W/O PERFORATION OR ABSCESS W/O BLEEDING: ICD-10-CM

## 2024-08-07 DIAGNOSIS — K44.9 PARAESOPHAGEAL HERNIA: ICD-10-CM

## 2024-08-07 DIAGNOSIS — D17.5 LIPOMA OF COLON: ICD-10-CM

## 2024-08-07 PROCEDURE — 250N000011 HC RX IP 250 OP 636: Performed by: INTERNAL MEDICINE

## 2024-08-07 PROCEDURE — 71260 CT THORAX DX C+: CPT

## 2024-08-07 RX ORDER — IOPAMIDOL 755 MG/ML
90 INJECTION, SOLUTION INTRAVASCULAR ONCE
Status: COMPLETED | OUTPATIENT
Start: 2024-08-07 | End: 2024-08-07

## 2024-08-07 RX ADMIN — IOPAMIDOL 90 ML: 755 INJECTION, SOLUTION INTRAVENOUS at 13:08

## 2024-08-08 ENCOUNTER — TRANSFERRED RECORDS (OUTPATIENT)
Dept: HEALTH INFORMATION MANAGEMENT | Facility: CLINIC | Age: 79
End: 2024-08-08
Payer: COMMERCIAL

## 2024-08-21 ENCOUNTER — HOSPITAL ENCOUNTER (OUTPATIENT)
Dept: RADIOLOGY | Facility: HOSPITAL | Age: 79
Discharge: HOME | End: 2024-08-21
Attending: INTERNAL MEDICINE | Admitting: INTERNAL MEDICINE
Payer: COMMERCIAL

## 2024-08-21 DIAGNOSIS — R10.13 EPIGASTRIC PAIN: ICD-10-CM

## 2024-08-21 PROCEDURE — 74240 X-RAY XM UPR GI TRC 1CNTRST: CPT

## 2024-08-21 PROCEDURE — 250N000013 HC RX MED GY IP 250 OP 250 PS 637: Performed by: INTERNAL MEDICINE

## 2024-08-21 RX ADMIN — ANTACID/ANTIFLATULENT 4 G: 380; 550; 10; 10 GRANULE, EFFERVESCENT ORAL at 09:27

## 2024-08-26 ENCOUNTER — TRANSFERRED RECORDS (OUTPATIENT)
Dept: HEALTH INFORMATION MANAGEMENT | Facility: CLINIC | Age: 79
End: 2024-08-26
Payer: COMMERCIAL

## 2024-09-03 ENCOUNTER — MEDICAL CORRESPONDENCE (OUTPATIENT)
Dept: HEALTH INFORMATION MANAGEMENT | Facility: CLINIC | Age: 79
End: 2024-09-03
Payer: COMMERCIAL

## 2024-09-03 ENCOUNTER — TRANSFERRED RECORDS (OUTPATIENT)
Dept: HEALTH INFORMATION MANAGEMENT | Facility: CLINIC | Age: 79
End: 2024-09-03
Payer: COMMERCIAL

## 2024-09-04 ENCOUNTER — TRANSCRIBE ORDERS (OUTPATIENT)
Dept: OTHER | Age: 79
End: 2024-09-04

## 2024-09-04 DIAGNOSIS — K42.9 UMBILICAL HERNIA: Primary | ICD-10-CM

## 2024-09-16 ENCOUNTER — OFFICE VISIT (OUTPATIENT)
Dept: SURGERY | Facility: CLINIC | Age: 79
End: 2024-09-16
Payer: COMMERCIAL

## 2024-09-16 VITALS
BODY MASS INDEX: 39.49 KG/M2 | HEIGHT: 59 IN | WEIGHT: 195.9 LBS | SYSTOLIC BLOOD PRESSURE: 134 MMHG | DIASTOLIC BLOOD PRESSURE: 76 MMHG

## 2024-09-16 DIAGNOSIS — K42.9 UMBILICAL HERNIA WITHOUT OBSTRUCTION AND WITHOUT GANGRENE: ICD-10-CM

## 2024-09-16 PROCEDURE — 99243 OFF/OP CNSLTJ NEW/EST LOW 30: CPT | Performed by: SPECIALIST

## 2024-09-16 RX ORDER — CEFAZOLIN SODIUM/WATER 2 G/20 ML
2 SYRINGE (ML) INTRAVENOUS
Status: CANCELLED | OUTPATIENT
Start: 2024-09-26

## 2024-09-16 RX ORDER — ACETAMINOPHEN 325 MG/1
975 TABLET ORAL ONCE
Status: CANCELLED | OUTPATIENT
Start: 2024-09-26 | End: 2024-09-16

## 2024-09-16 RX ORDER — CEFAZOLIN SODIUM/WATER 2 G/20 ML
2 SYRINGE (ML) INTRAVENOUS SEE ADMIN INSTRUCTIONS
Status: CANCELLED | OUTPATIENT
Start: 2024-09-26

## 2024-09-16 NOTE — PROGRESS NOTES
"      HPI:  This is a 78 year old female here today with concerns of pain and bulging in her umbilicus area. She has noted this for the past a a few  month. The symptoms have progressed and increased over this time. She comes in for evaluation secondary to the hernia causing enough issues to bother them with daily activities or chores.    Allergies:Penicillins    Past Medical History:   Diagnosis Date    High cholesterol     Hypertension     Infection due to 2019 novel coronavirus     january 2023 - PAXLOVID    Pancreatitis        Past Surgical History:   Procedure Laterality Date    CHOLECYSTECTOMY      IR MISCELLANEOUS PROCEDURE  10/1/2009    JOINT REPLACEMENT      SPINE SURGERY         CURRENT MEDS:  No current facility-administered medications for this visit.       Family History   Problem Relation Age of Onset    Early Death Mother     Cancer Father     No Known Problems Brother     Breast Cancer Other         reports that she quit smoking about 27 years ago. Her smoking use included cigarettes. She has never used smokeless tobacco. She reports that she does not drink alcohol and does not use drugs.    Review of Systems - Negative except umbilical hernia and pain. Otherwise twelve system of review is negative.      Vitals:    09/16/24 1140   BP: 134/76   Weight: 88.9 kg (195 lb 14.4 oz)   Height: 1.499 m (4' 11\")       Body mass index is 39.57 kg/m .    EXAM:  General: NAD   HEENT: normocephalic, PERRLA and EOMS intact  Mounth: Mucus membranes moist  Neck: Supple  Chest: Clear to auscultation bilaterally  CV: RRR  ABD: Soft nontender and nondistended, umbilical hernia, nonreducible  EXT: Warm, pulses intact,   Neuro: Alert and oriented x3  Back: no CVA tenderness      IMAGES:        Exam Information    Exam Date Exam Time Exam Date Exam Time Accession # Performing Department Results    8/7/24  1:07 PM 8/7/24  1:07 PM [id number] [facility] CT      PACS Images     Show " images for CT Chest/Abdomen/Pelvis w Contrast     Study Result    Narrative & Impression   EXAM: CT CHEST, ABDOMEN AND PELVIS WITH CONTRAST  LOCATION: [facility]  DATE/TIME: 8/7/2024 1:07 PM CDT     INDICATION: Paraesophageal hernia. Ulcerative colitis without complication. Colonic diverticulosis. Colonic lipoma.  COMPARISON: 4/7/2023 - CT abdomen and pelvis.     TECHNIQUE: CT scan of the chest, abdomen, and pelvis was performed following injection of IV contrast. Multiplanar reformats were obtained. Dose reduction techniques were used.  CONTRAST: 90 mL Isovue-370.     FINDINGS:     LUNGS AND PLEURA: A few curvilinear opacities scattered within both lungs, likely representing scarring and/or atelectasis.     MEDIASTINUM/AXILLAE: Atherosclerotic calcification in the aorta. A few subcentimeter nodules in the thyroid gland.     CORONARY ARTERY CALCIFICATION: Absent.     HEPATOBILIARY: Intrahepatic pneumobilia, likely relating to prior sphincterotomy or biliary procedure. The gallbladder is not visualized.     SPLEEN: Absent.     PANCREAS: Unremarkable.     ADRENAL GLANDS: Unremarkable.     KIDNEYS/BLADDER: 0.3 cm nonobstructing calculus in the inferior pole of the left kidney.     BOWEL: The duodenum does not cross the midline from right to left. This could relate to a congenital small bowel malrotation or prior surgery. The stomach, small and large bowel are normal in caliber. Several colonic diverticula are present, without   evidence of diverticulitis. The appendix is not visualized. No bowel wall thickening, pneumatosis or free intraperitoneal gas.     LYMPH NODES: Unremarkable.     PELVIC ORGANS: 5.3 x 4.9 cm simple-appearing right adnexal cyst (series 3 image 223). This previously measured 5.0 x 4.6 cm.     MUSCULOSKELETAL: Partial visualization of bilateral hip arthroplasties. Fusion hardware in the lumbar spine.     OTHER: Small paraumbilical hernia containing fat and a small amount of  fluid.                                                                      IMPRESSION:   1.  No acute abnormality identified in the chest, abdomen or pelvis.  2.  5.3 x 4.9 cm right adnexal cyst. This is nonspecific, but is likely ovarian in origin. It has likely slightly increased in size since 4/7/2023.      Assessment/Plan: Pt with a umbilical hernia. I discussed this at length with She.  I went over conservative management as well as surgical treatment of this.. I would plan on doing this via anrobotic  approach with possible use of mesh. I went over the small risks of surgery including but not limited to bleeding and infection, anesthesia, recurrence rates and nerve injury. I discussed the expected recovery time as well. Will get this scheduled. She will contact us to have this scheduled.      Uri Daily MD ,MD Uri Daily MD  General Surgery [phone]  Vascular Surgery [phone]

## 2024-09-16 NOTE — LETTER
"9/16/2024      Natasha Thomas  [address] [place] [address]      Dear Colleague,    Thank you for referring your patient, Natasha Thomas, to the [facility] [place]. Please see a copy of my visit note below.          HPI:  This is a 78 year old female here today with concerns of pain and bulging in her umbilicus area. She has noted this for the past a a few  month. The symptoms have progressed and increased over this time. She comes in for evaluation secondary to the hernia causing enough issues to bother them with daily activities or chores.    Allergies:Penicillins    Past Medical History:   Diagnosis Date     High cholesterol      Hypertension      Infection due to 2019 novel coronavirus     january 2023 - PAXLOVID     Pancreatitis        Past Surgical History:   Procedure Laterality Date     CHOLECYSTECTOMY       IR MISCELLANEOUS PROCEDURE  10/1/2009     JOINT REPLACEMENT       SPINE SURGERY         CURRENT MEDS:  No current facility-administered medications for this visit.       Family History   Problem Relation Age of Onset     Early Death Mother      Cancer Father      No Known Problems Brother      Breast Cancer Other         reports that she quit smoking about 27 years ago. Her smoking use included cigarettes. She has never used smokeless tobacco. She reports that she does not drink alcohol and does not use drugs.    Review of Systems - Negative except umbilical hernia and pain. Otherwise twelve system of review is negative.      Vitals:    09/16/24 1140   BP: 134/76   Weight: 88.9 kg (195 lb 14.4 oz)   Height: 1.499 m (4' 11\")       Body mass index is 39.57 kg/m .    EXAM:  General: NAD   HEENT: normocephalic, PERRLA and EOMS intact  Mounth: Mucus membranes moist  Neck: Supple  Chest: Clear to auscultation bilaterally  CV: RRR  ABD: Soft nontender and nondistended, umbilical hernia, nonreducible  EXT: Warm, pulses intact,   Neuro: Alert and " oriented x3  Back: no CVA tenderness      IMAGES:        Exam Information    Exam Date Exam Time Exam Date Exam Time Accession # Performing Department Results    8/7/24  1:07 PM 8/7/24  1:07 PM [id number] [facility] CT      PACS Images     Show images for CT Chest/Abdomen/Pelvis w Contrast     Study Result    Narrative & Impression   EXAM: CT CHEST, ABDOMEN AND PELVIS WITH CONTRAST  LOCATION: [facility]  DATE/TIME: 8/7/2024 1:07 PM CDT     INDICATION: Paraesophageal hernia. Ulcerative colitis without complication. Colonic diverticulosis. Colonic lipoma.  COMPARISON: 4/7/2023 - CT abdomen and pelvis.     TECHNIQUE: CT scan of the chest, abdomen, and pelvis was performed following injection of IV contrast. Multiplanar reformats were obtained. Dose reduction techniques were used.  CONTRAST: 90 mL Isovue-370.     FINDINGS:     LUNGS AND PLEURA: A few curvilinear opacities scattered within both lungs, likely representing scarring and/or atelectasis.     MEDIASTINUM/AXILLAE: Atherosclerotic calcification in the aorta. A few subcentimeter nodules in the thyroid gland.     CORONARY ARTERY CALCIFICATION: Absent.     HEPATOBILIARY: Intrahepatic pneumobilia, likely relating to prior sphincterotomy or biliary procedure. The gallbladder is not visualized.     SPLEEN: Absent.     PANCREAS: Unremarkable.     ADRENAL GLANDS: Unremarkable.     KIDNEYS/BLADDER: 0.3 cm nonobstructing calculus in the inferior pole of the left kidney.     BOWEL: The duodenum does not cross the midline from right to left. This could relate to a congenital small bowel malrotation or prior surgery. The stomach, small and large bowel are normal in caliber. Several colonic diverticula are present, without   evidence of diverticulitis. The appendix is not visualized. No bowel wall thickening, pneumatosis or free intraperitoneal gas.     LYMPH NODES: Unremarkable.     PELVIC ORGANS: 5.3 x 4.9 cm  simple-appearing right adnexal cyst (series 3 image 223). This previously measured 5.0 x 4.6 cm.     MUSCULOSKELETAL: Partial visualization of bilateral hip arthroplasties. Fusion hardware in the lumbar spine.     OTHER: Small paraumbilical hernia containing fat and a small amount of fluid.                                                                      IMPRESSION:   1.  No acute abnormality identified in the chest, abdomen or pelvis.  2.  5.3 x 4.9 cm right adnexal cyst. This is nonspecific, but is likely ovarian in origin. It has likely slightly increased in size since 4/7/2023.      Assessment/Plan: Pt with a umbilical hernia. I discussed this at length with She.  I went over conservative management as well as surgical treatment of this.. I would plan on doing this via anrobotic  approach with possible use of mesh. I went over the small risks of surgery including but not limited to bleeding and infection, anesthesia, recurrence rates and nerve injury. I discussed the expected recovery time as well. Will get this scheduled. She will contact us to have this scheduled.      Uri Daily MD ,MD Uri Daily MD  General Surgery [phone]  Vascular Surgery [phone]          Again, thank you for allowing me to participate in the care of your patient.        Sincerely,        Uri Daily MD

## 2024-09-19 ENCOUNTER — OFFICE VISIT (OUTPATIENT)
Dept: FAMILY MEDICINE | Facility: CLINIC | Age: 79
End: 2024-09-19
Payer: COMMERCIAL

## 2024-09-19 VITALS
BODY MASS INDEX: 39.43 KG/M2 | TEMPERATURE: 97.9 F | DIASTOLIC BLOOD PRESSURE: 76 MMHG | HEIGHT: 59 IN | WEIGHT: 195.6 LBS | OXYGEN SATURATION: 95 % | SYSTOLIC BLOOD PRESSURE: 122 MMHG | RESPIRATION RATE: 18 BRPM | HEART RATE: 60 BPM

## 2024-09-19 DIAGNOSIS — E66.01 MORBID OBESITY (H): ICD-10-CM

## 2024-09-19 DIAGNOSIS — K42.9 UMBILICAL HERNIA WITHOUT OBSTRUCTION AND WITHOUT GANGRENE: ICD-10-CM

## 2024-09-19 DIAGNOSIS — K51.919 ULCERATIVE COLITIS WITH COMPLICATION, UNSPECIFIED LOCATION (H): ICD-10-CM

## 2024-09-19 DIAGNOSIS — Z01.818 PRE-OP EXAMINATION: Primary | ICD-10-CM

## 2024-09-19 DIAGNOSIS — E03.9 HYPOTHYROIDISM, UNSPECIFIED TYPE: ICD-10-CM

## 2024-09-19 DIAGNOSIS — I10 BENIGN ESSENTIAL HYPERTENSION: ICD-10-CM

## 2024-09-19 DIAGNOSIS — K21.00 GASTROESOPHAGEAL REFLUX DISEASE WITH ESOPHAGITIS, UNSPECIFIED WHETHER HEMORRHAGE: ICD-10-CM

## 2024-09-19 LAB
ANION GAP SERPL CALCULATED.3IONS-SCNC: 8 MMOL/L (ref 7–15)
BUN SERPL-MCNC: 18.5 MG/DL (ref 8–23)
CALCIUM SERPL-MCNC: 9.9 MG/DL (ref 8.8–10.4)
CHLORIDE SERPL-SCNC: 105 MMOL/L (ref 98–107)
CREAT SERPL-MCNC: 0.68 MG/DL (ref 0.51–0.95)
EGFRCR SERPLBLD CKD-EPI 2021: 89 ML/MIN/1.73M2
ERYTHROCYTE [DISTWIDTH] IN BLOOD BY AUTOMATED COUNT: 12 % (ref 10–15)
GLUCOSE SERPL-MCNC: 85 MG/DL (ref 70–99)
HCO3 SERPL-SCNC: 27 MMOL/L (ref 22–29)
HCT VFR BLD AUTO: 41.5 % (ref 35–47)
HGB BLD-MCNC: 14 G/DL (ref 11.7–15.7)
MCH RBC QN AUTO: 30.8 PG (ref 26.5–33)
MCHC RBC AUTO-ENTMCNC: 33.7 G/DL (ref 31.5–36.5)
MCV RBC AUTO: 91 FL (ref 78–100)
PLATELET # BLD AUTO: 431 10E3/UL (ref 150–450)
POTASSIUM SERPL-SCNC: 4.6 MMOL/L (ref 3.4–5.3)
RBC # BLD AUTO: 4.55 10E6/UL (ref 3.8–5.2)
SODIUM SERPL-SCNC: 140 MMOL/L (ref 135–145)
WBC # BLD AUTO: 8.6 10E3/UL (ref 4–11)

## 2024-09-19 PROCEDURE — 80048 BASIC METABOLIC PNL TOTAL CA: CPT

## 2024-09-19 PROCEDURE — 85027 COMPLETE CBC AUTOMATED: CPT

## 2024-09-19 PROCEDURE — 36415 COLL VENOUS BLD VENIPUNCTURE: CPT

## 2024-09-19 PROCEDURE — 99214 OFFICE O/P EST MOD 30 MIN: CPT

## 2024-09-19 ASSESSMENT — ASTHMA QUESTIONNAIRES
QUESTION_5 LAST FOUR WEEKS HOW WOULD YOU RATE YOUR ASTHMA CONTROL: COMPLETELY CONTROLLED
QUESTION_2 LAST FOUR WEEKS HOW OFTEN HAVE YOU HAD SHORTNESS OF BREATH: NOT AT ALL
ACT_TOTALSCORE: 25
QUESTION_1 LAST FOUR WEEKS HOW MUCH OF THE TIME DID YOUR ASTHMA KEEP YOU FROM GETTING AS MUCH DONE AT WORK, SCHOOL OR AT HOME: NONE OF THE TIME
QUESTION_3 LAST FOUR WEEKS HOW OFTEN DID YOUR ASTHMA SYMPTOMS (WHEEZING, COUGHING, SHORTNESS OF BREATH, CHEST TIGHTNESS OR PAIN) WAKE YOU UP AT NIGHT OR EARLIER THAN USUAL IN THE MORNING: NOT AT ALL
ACT_TOTALSCORE: 25
QUESTION_4 LAST FOUR WEEKS HOW OFTEN HAVE YOU USED YOUR RESCUE INHALER OR NEBULIZER MEDICATION (SUCH AS ALBUTEROL): NOT AT ALL

## 2024-09-19 ASSESSMENT — PAIN SCALES - GENERAL: PAINLEVEL: MILD PAIN (2)

## 2024-09-19 NOTE — PROGRESS NOTES
Preoperative Evaluation  [facility]  [address]  [facility] [address]  Phone: [phone]  Fax: [phone]  Primary Provider: Niraj Biswas MD  Pre-op Performing Provider: WENDY Francisco CNP  Sep 19, 2024             9/19/2024   Surgical Information   What procedure is being done? hernia surgery   Facility or Hospital where procedure/surgery will be performed: [facility]   Who is doing the procedure / surgery? Uri Daily   Date of surgery / procedure: 9/26/24   Time of surgery / procedure: 11:30 am   Where do you plan to recover after surgery? at home with family      Fax number for surgical facility: Note does not need to be faxed, will be available electronically in Epic.    Assessment & Plan     The proposed surgical procedure is considered INTERMEDIATE risk.    Pre-op examination  Scheduled 9/26/2024 for herniorrhaphy umbilical with Dr. Uri Daily.  No prior complications with anesthesia.  Splenectomy at age 6.    - Basic metabolic panel  - CBC with platelets    Umbilical hernia without obstruction and without gangrene  Umbilical hernia since 2 years ago.  Reports umbilical hernia has been causing pain and is bothersome during daily activities.  Hernia reducible in clinic.    Morbid obesity (H)  BMI 39.51.    Hypothyroidism, unspecified type  12/2022 TSH 0.82.  Previous diagnosis of hypothyroidism.  No medication management.    Benign essential hypertension  Blood pressure in clinic 122/76.  Controlled with amlodipine 5 mg and carvedilol 12.5 mg BID daily.      Ulcerative colitis with complication, unspecified location (H)  No current colitis symptoms.  Managed with mesalamine 1.2 g daily.     GERD  GERD managed with famotidine 20 mg BID.     - No identified additional risk factors other than previously addressed    Antiplatelet or Anticoagulation Medication Instructions   - Patient is on no antiplatelet or anticoagulation medications.    Additional Medication  Instructions  Take medication as instructed.  Do not take any vitamins or supplements within 14 days of procedure.    Recommendation  Approval given to proceed with proposed procedure, without further diagnostic evaluation.    Beth Kaur is a 78 year old, presenting for the following:  Pre-Op Exam        9/19/2024     9:37 AM   Additional Questions   Roomed by ORQUIDEA Cho related to upcoming procedure:     Patient is a 78-year-old female presenting for preoperative examination.  Scheduled 9/26/2024 for herniorrhaphy umbilical with Dr. Uri Daily.  No prior complications with anesthesia.  Medical history includes UC, prediabetes, obesity, hypothyroidism, constipation, recurrent pancreatitis, HTN.  Splenectomy at age 6.  Development of umbilical hernia approximately 2 years ago.  Reports umbilical hernia has been causing pain and is bothersome during daily activities.          9/19/2024   Pre-Op Questionnaire   Have you ever had a heart attack or stroke? No   Have you ever had surgery on your heart or blood vessels, such as a stent placement, a coronary artery bypass, or surgery on an artery in your head, neck, heart, or legs? No   Do you have chest pain with activity? No   Do you have a history of heart failure? No   Do you currently have a cold, bronchitis or symptoms of other infection? No   Do you have a cough, shortness of breath, or wheezing? No   Do you or anyone in your family have previous history of blood clots? No   Do you or does anyone in your family have a serious bleeding problem such as prolonged bleeding following surgeries or cuts? No   Have you ever had problems with anemia or been told to take iron pills? No   Have you had any abnormal blood loss such as black, tarry or bloody stools, or abnormal vaginal bleeding? No   Have you ever had a blood transfusion? (!) YES, with past surgeries r/t to blood loss many years ago.   Have you ever had a transfusion reaction? No   Are you  willing to have a blood transfusion if it is medically needed before, during, or after your surgery? Yes   Have you or any of your relatives ever had problems with anesthesia? No   Do you have sleep apnea, excessive snoring or daytime drowsiness? No   Do you have any artifical heart valves or other implanted medical devices like a pacemaker, defibrillator, or continuous glucose monitor? No   Do you have artificial joints? (!) YES, bilateral hips   Are you allergic to latex? No      Health Care Directive  Patient does not have a Health Care Directive or Living Will    Preoperative Review of    reviewed - no record of controlled substances prescribed.      Patient Active Problem List    Diagnosis Date Noted    Chronic constipation 03/12/2024     Priority: Medium    Left lower quadrant pain 12/08/2022     Priority: Medium    Diarrhea 12/08/2022     Priority: Medium    Overweight 12/06/2022     Priority: Medium     Formatting of this note might be different from the original.  Created by Conversion      Hypothyroidism 12/06/2022     Priority: Medium     Formatting of this note might be different from the original.  Created by Conversion    Replacement Utility updated for latest IMO load      External hemorrhoids 12/06/2022     Priority: Medium     Formatting of this note might be different from the original.  Created by Conversion    Replacement Utility updated for latest IMO load      Essential hematuria 12/06/2022     Priority: Medium     Formatting of this note might be different from the original.  Created by Conversion      Chronic recurrent pancreatitis (H) 12/06/2022     Priority: Medium     Formatting of this note might be different from the original.  Created by Conversion      Benign essential hypertension 12/06/2022     Priority: Medium     Formatting of this note might be different from the original.  Created by Conversion      Ulcerative colitis (H) 12/02/2019     Priority: Medium    Benign neoplasm  of colon 12/02/2019     Priority: Medium    Polyp of colon 11/27/2019     Priority: Medium    Morbid obesity (H) 12/14/2018     Priority: Medium    Prediabetes 12/14/2018     Priority: Medium     Formatting of this note might be different from the original.  A1c 5.9% on 12/14/18      Noninfectious gastroenteritis 10/23/2017     Priority: Medium    Diverticulosis of colon 05/24/2011     Priority: Medium    Cyst and pseudocyst of pancreas 09/24/2008     Priority: Medium      Past Medical History:   Diagnosis Date    High cholesterol     Hypertension     Infection due to 2019 novel coronavirus     january 2023 - PAXLOVID    Pancreatitis      Past Surgical History:   Procedure Laterality Date    CHOLECYSTECTOMY      IR MISCELLANEOUS PROCEDURE  10/1/2009    JOINT REPLACEMENT      SPINE SURGERY       Current Outpatient Medications   Medication Sig Dispense Refill    amLODIPine (NORVASC) 5 MG tablet TAKE ONE TABLET BY MOUTH ONE TIME DAILY 90 tablet 1    calcium carbonate-vitamin D2 500 mg(1,250mg) -200 unit tablet Take 1 tablet by mouth 2 times daily      carvedilol (COREG) 12.5 MG tablet Take 1 tablet by mouth 2 times a day with meals. 180 tablet 0    famotidine (PEPCID) 20 MG tablet [FAMOTIDINE (PEPCID) 20 MG TABLET] Take 1 tablet (20 mg total) by mouth 2 (two) times a day. 60 tablet 1    mesalamine (LIALDA) 1.2 gram EC tablet [MESALAMINE (LIALDA) 1.2 GRAM EC TABLET] Take 1.2 g by mouth daily.  5    albuterol (PROAIR HFA/PROVENTIL HFA/VENTOLIN HFA) 108 (90 Base) MCG/ACT inhaler Inhale 1-2 puffs into the lungs every 4 hours as needed for wheezing (Patient not taking: Reported on 9/19/2024) 18 g 1    aspirin (ASA) 81 MG EC tablet  (Patient not taking: Reported on 9/19/2024)      CHOLECALCIFEROL, VITAMIN D3, (VITAMIN D3 ORAL) Take 3,000 Int'l Units by mouth (Patient not taking: Reported on 9/19/2024)      co-enzyme Q-10 30 mg capsule Take 30 mg by mouth 3 times daily (Patient not taking: Reported on 9/19/2024)       "CYANOCOBALAMIN, VITAMIN B-12, (VITAMIN B-12 ORAL)  (Patient not taking: Reported on 2024)      KRILL OIL ORAL  (Patient not taking: Reported on 2024)      MAGNESIUM ORAL  (Patient not taking: Reported on 2024)      omega-3 fatty acids-fish oil 340-1,000 mg cap Take 1 capsule by mouth (Patient not taking: Reported on 2024)      POTASSIUM CHLORIDE ORAL  (Patient not taking: Reported on 2024)      TURMERIC ROOT EXTRACT ORAL  (Patient not taking: Reported on 2024)      valACYclovir (VALTREX) 1000 mg tablet Take 1 tablet (1,000 mg) by mouth 3 times daily for 7 days 21 tablet 0    VITAMIN E ACETATE (VITAMIN E ORAL)  (Patient not taking: Reported on 2024)       Allergies   Allergen Reactions    Penicillins Hives      Social History     Tobacco Use    Smoking status: Former     Current packs/day: 0.00     Types: Cigarettes     Quit date: 1997     Years since quittin.7    Smokeless tobacco: Never   Substance Use Topics    Alcohol use: No     Comment: Alcoholic Drinks/day: very rre social drink     Family History   Problem Relation Age of Onset    Early Death Mother     Cancer Father     No Known Problems Brother     Breast Cancer Other      History   Drug Use No     Review of Systems  Review of systems negative otherwise known HPI.    Objective    Pulse 60   Temp 97.9  F (36.6  C) (Temporal)   Resp 18   Ht 1.499 m (4' 11\")   Wt 88.7 kg (195 lb 9.6 oz)   LMP  (LMP Unknown)   SpO2 95%   BMI 39.51 kg/m     Estimated body mass index is 39.51 kg/m  as calculated from the following:    Height as of this encounter: 1.499 m (4' 11\").    Weight as of this encounter: 88.7 kg (195 lb 9.6 oz).  Physical Exam  General: Alert, oriented, no acute distress.    Head: Normocephalic and atraumatic.   Eyes: Conjunctiva and sclera clear. TERRIE.   Ears: External ear nontender. TMs intact and clear. Grossly normal hearing.   Oropharynx: Dentition intact. Oral and posterior pharynx pink and moist.   "   Neck: Supple, no masses or nodes. No adenopathy.    Respiratory: Lungs clear, unlabored. No rales, rhonchi, or wheezes.    Cardiovascular: Regular rate and rhythm, S1 and S2. No murmurs. No peripheral edema.     Gastrointestinal: Normoactive bowel sounds. Soft, nontender, no organomegaly. Reducible umbilical hernia.   Musculoskeletal: No joint tenderness, deformity, or swelling.     Skin: No suspicious lesions, rashes, or abnormalities.    Neurologic: No gross motor or sensory deficit. Mentation intact and speech normal.     Psychiatric: Appropriate affect.     Recent Labs   Lab Test 08/07/24  1254 03/14/24  1228 10/17/23  1250   HGB  --  14.4 15.4   PLT  --  469* 429   NA  --  141 140   POTASSIUM  --  4.7 3.8   CR 0.6 0.70 0.64      Diagnostics  Labs pending at this time.  Results will be reviewed when available.   No EKG required, no history of coronary heart disease, significant arrhythmia, peripheral arterial disease or other structural heart disease.    Revised Cardiac Risk Index (RCRI)  The patient has the following serious cardiovascular risks for perioperative complications:   - No serious cardiac risks = 0 points     RCRI Interpretation: 0 points: Class I (very low risk - 0.4% complication rate)    Signed Electronically by: WENDY Francisco CNP  A copy of this evaluation report is provided to the requesting physician.

## 2024-09-19 NOTE — H&P (VIEW-ONLY)
Preoperative Evaluation  [facility]  [address]  [facility] [address]  Phone: [phone]  Fax: [phone]  Primary Provider: Niraj Biswas MD  Pre-op Performing Provider: WENDY Francisco CNP  Sep 19, 2024             9/19/2024   Surgical Information   What procedure is being done? hernia surgery   Facility or Hospital where procedure/surgery will be performed: [facility]   Who is doing the procedure / surgery? Uri Daily   Date of surgery / procedure: 9/26/24   Time of surgery / procedure: 11:30 am   Where do you plan to recover after surgery? at home with family      Fax number for surgical facility: Note does not need to be faxed, will be available electronically in Epic.    Assessment & Plan     The proposed surgical procedure is considered INTERMEDIATE risk.    Pre-op examination  Scheduled 9/26/2024 for herniorrhaphy umbilical with Dr. Uri Daily.  No prior complications with anesthesia.  Splenectomy at age 6.    - Basic metabolic panel  - CBC with platelets    Umbilical hernia without obstruction and without gangrene  Umbilical hernia since 2 years ago.  Reports umbilical hernia has been causing pain and is bothersome during daily activities.  Hernia reducible in clinic.    Morbid obesity (H)  BMI 39.51.    Hypothyroidism, unspecified type  12/2022 TSH 0.82.  Previous diagnosis of hypothyroidism.  No medication management.    Benign essential hypertension  Blood pressure in clinic 122/76.  Controlled with amlodipine 5 mg and carvedilol 12.5 mg BID daily.      Ulcerative colitis with complication, unspecified location (H)  No current colitis symptoms.  Managed with mesalamine 1.2 g daily.     GERD  GERD managed with famotidine 20 mg BID.     - No identified additional risk factors other than previously addressed    Antiplatelet or Anticoagulation Medication Instructions   - Patient is on no antiplatelet or anticoagulation medications.    Additional Medication  Instructions  Take medication as instructed.  Do not take any vitamins or supplements within 14 days of procedure.    Recommendation  Approval given to proceed with proposed procedure, without further diagnostic evaluation.    Beth Kaur is a 78 year old, presenting for the following:  Pre-Op Exam        9/19/2024     9:37 AM   Additional Questions   Roomed by ORQUIDEA Cho related to upcoming procedure:     Patient is a 78-year-old female presenting for preoperative examination.  Scheduled 9/26/2024 for herniorrhaphy umbilical with Dr. Uri Daily.  No prior complications with anesthesia.  Medical history includes UC, prediabetes, obesity, hypothyroidism, constipation, recurrent pancreatitis, HTN.  Splenectomy at age 6.  Development of umbilical hernia approximately 2 years ago.  Reports umbilical hernia has been causing pain and is bothersome during daily activities.          9/19/2024   Pre-Op Questionnaire   Have you ever had a heart attack or stroke? No   Have you ever had surgery on your heart or blood vessels, such as a stent placement, a coronary artery bypass, or surgery on an artery in your head, neck, heart, or legs? No   Do you have chest pain with activity? No   Do you have a history of heart failure? No   Do you currently have a cold, bronchitis or symptoms of other infection? No   Do you have a cough, shortness of breath, or wheezing? No   Do you or anyone in your family have previous history of blood clots? No   Do you or does anyone in your family have a serious bleeding problem such as prolonged bleeding following surgeries or cuts? No   Have you ever had problems with anemia or been told to take iron pills? No   Have you had any abnormal blood loss such as black, tarry or bloody stools, or abnormal vaginal bleeding? No   Have you ever had a blood transfusion? (!) YES, with past surgeries r/t to blood loss many years ago.   Have you ever had a transfusion reaction? No   Are you  willing to have a blood transfusion if it is medically needed before, during, or after your surgery? Yes   Have you or any of your relatives ever had problems with anesthesia? No   Do you have sleep apnea, excessive snoring or daytime drowsiness? No   Do you have any artifical heart valves or other implanted medical devices like a pacemaker, defibrillator, or continuous glucose monitor? No   Do you have artificial joints? (!) YES, bilateral hips   Are you allergic to latex? No      Health Care Directive  Patient does not have a Health Care Directive or Living Will    Preoperative Review of    reviewed - no record of controlled substances prescribed.      Patient Active Problem List    Diagnosis Date Noted    Chronic constipation 03/12/2024     Priority: Medium    Left lower quadrant pain 12/08/2022     Priority: Medium    Diarrhea 12/08/2022     Priority: Medium    Overweight 12/06/2022     Priority: Medium     Formatting of this note might be different from the original.  Created by Conversion      Hypothyroidism 12/06/2022     Priority: Medium     Formatting of this note might be different from the original.  Created by Conversion    Replacement Utility updated for latest IMO load      External hemorrhoids 12/06/2022     Priority: Medium     Formatting of this note might be different from the original.  Created by Conversion    Replacement Utility updated for latest IMO load      Essential hematuria 12/06/2022     Priority: Medium     Formatting of this note might be different from the original.  Created by Conversion      Chronic recurrent pancreatitis (H) 12/06/2022     Priority: Medium     Formatting of this note might be different from the original.  Created by Conversion      Benign essential hypertension 12/06/2022     Priority: Medium     Formatting of this note might be different from the original.  Created by Conversion      Ulcerative colitis (H) 12/02/2019     Priority: Medium    Benign neoplasm  of colon 12/02/2019     Priority: Medium    Polyp of colon 11/27/2019     Priority: Medium    Morbid obesity (H) 12/14/2018     Priority: Medium    Prediabetes 12/14/2018     Priority: Medium     Formatting of this note might be different from the original.  A1c 5.9% on 12/14/18      Noninfectious gastroenteritis 10/23/2017     Priority: Medium    Diverticulosis of colon 05/24/2011     Priority: Medium    Cyst and pseudocyst of pancreas 09/24/2008     Priority: Medium      Past Medical History:   Diagnosis Date    High cholesterol     Hypertension     Infection due to 2019 novel coronavirus     january 2023 - PAXLOVID    Pancreatitis      Past Surgical History:   Procedure Laterality Date    CHOLECYSTECTOMY      IR MISCELLANEOUS PROCEDURE  10/1/2009    JOINT REPLACEMENT      SPINE SURGERY       Current Outpatient Medications   Medication Sig Dispense Refill    amLODIPine (NORVASC) 5 MG tablet TAKE ONE TABLET BY MOUTH ONE TIME DAILY 90 tablet 1    calcium carbonate-vitamin D2 500 mg(1,250mg) -200 unit tablet Take 1 tablet by mouth 2 times daily      carvedilol (COREG) 12.5 MG tablet Take 1 tablet by mouth 2 times a day with meals. 180 tablet 0    famotidine (PEPCID) 20 MG tablet [FAMOTIDINE (PEPCID) 20 MG TABLET] Take 1 tablet (20 mg total) by mouth 2 (two) times a day. 60 tablet 1    mesalamine (LIALDA) 1.2 gram EC tablet [MESALAMINE (LIALDA) 1.2 GRAM EC TABLET] Take 1.2 g by mouth daily.  5    albuterol (PROAIR HFA/PROVENTIL HFA/VENTOLIN HFA) 108 (90 Base) MCG/ACT inhaler Inhale 1-2 puffs into the lungs every 4 hours as needed for wheezing (Patient not taking: Reported on 9/19/2024) 18 g 1    aspirin (ASA) 81 MG EC tablet  (Patient not taking: Reported on 9/19/2024)      CHOLECALCIFEROL, VITAMIN D3, (VITAMIN D3 ORAL) Take 3,000 Int'l Units by mouth (Patient not taking: Reported on 9/19/2024)      co-enzyme Q-10 30 mg capsule Take 30 mg by mouth 3 times daily (Patient not taking: Reported on 9/19/2024)       "CYANOCOBALAMIN, VITAMIN B-12, (VITAMIN B-12 ORAL)  (Patient not taking: Reported on 2024)      KRILL OIL ORAL  (Patient not taking: Reported on 2024)      MAGNESIUM ORAL  (Patient not taking: Reported on 2024)      omega-3 fatty acids-fish oil 340-1,000 mg cap Take 1 capsule by mouth (Patient not taking: Reported on 2024)      POTASSIUM CHLORIDE ORAL  (Patient not taking: Reported on 2024)      TURMERIC ROOT EXTRACT ORAL  (Patient not taking: Reported on 2024)      valACYclovir (VALTREX) 1000 mg tablet Take 1 tablet (1,000 mg) by mouth 3 times daily for 7 days 21 tablet 0    VITAMIN E ACETATE (VITAMIN E ORAL)  (Patient not taking: Reported on 2024)       Allergies   Allergen Reactions    Penicillins Hives      Social History     Tobacco Use    Smoking status: Former     Current packs/day: 0.00     Types: Cigarettes     Quit date: 1997     Years since quittin.7    Smokeless tobacco: Never   Substance Use Topics    Alcohol use: No     Comment: Alcoholic Drinks/day: very rre social drink     Family History   Problem Relation Age of Onset    Early Death Mother     Cancer Father     No Known Problems Brother     Breast Cancer Other      History   Drug Use No     Review of Systems  Review of systems negative otherwise known HPI.    Objective    Pulse 60   Temp 97.9  F (36.6  C) (Temporal)   Resp 18   Ht 1.499 m (4' 11\")   Wt 88.7 kg (195 lb 9.6 oz)   LMP  (LMP Unknown)   SpO2 95%   BMI 39.51 kg/m     Estimated body mass index is 39.51 kg/m  as calculated from the following:    Height as of this encounter: 1.499 m (4' 11\").    Weight as of this encounter: 88.7 kg (195 lb 9.6 oz).  Physical Exam  General: Alert, oriented, no acute distress.    Head: Normocephalic and atraumatic.   Eyes: Conjunctiva and sclera clear. TERRIE.   Ears: External ear nontender. TMs intact and clear. Grossly normal hearing.   Oropharynx: Dentition intact. Oral and posterior pharynx pink and moist.   "   Neck: Supple, no masses or nodes. No adenopathy.    Respiratory: Lungs clear, unlabored. No rales, rhonchi, or wheezes.    Cardiovascular: Regular rate and rhythm, S1 and S2. No murmurs. No peripheral edema.     Gastrointestinal: Normoactive bowel sounds. Soft, nontender, no organomegaly. Reducible umbilical hernia.   Musculoskeletal: No joint tenderness, deformity, or swelling.     Skin: No suspicious lesions, rashes, or abnormalities.    Neurologic: No gross motor or sensory deficit. Mentation intact and speech normal.     Psychiatric: Appropriate affect.     Recent Labs   Lab Test 08/07/24  1254 03/14/24  1228 10/17/23  1250   HGB  --  14.4 15.4   PLT  --  469* 429   NA  --  141 140   POTASSIUM  --  4.7 3.8   CR 0.6 0.70 0.64      Diagnostics  Labs pending at this time.  Results will be reviewed when available.   No EKG required, no history of coronary heart disease, significant arrhythmia, peripheral arterial disease or other structural heart disease.    Revised Cardiac Risk Index (RCRI)  The patient has the following serious cardiovascular risks for perioperative complications:   - No serious cardiac risks = 0 points     RCRI Interpretation: 0 points: Class I (very low risk - 0.4% complication rate)    Signed Electronically by: WENDY Francisco CNP  A copy of this evaluation report is provided to the requesting physician.

## 2024-09-26 ENCOUNTER — ANESTHESIA EVENT (OUTPATIENT)
Dept: SURGERY | Facility: HOSPITAL | Age: 79
End: 2024-09-26
Payer: COMMERCIAL

## 2024-09-26 ENCOUNTER — HOSPITAL ENCOUNTER (OUTPATIENT)
Facility: HOSPITAL | Age: 79
Discharge: HOME | End: 2024-09-26
Attending: SPECIALIST | Admitting: SPECIALIST
Payer: COMMERCIAL

## 2024-09-26 ENCOUNTER — ANESTHESIA (OUTPATIENT)
Dept: SURGERY | Facility: HOSPITAL | Age: 79
End: 2024-09-26
Payer: COMMERCIAL

## 2024-09-26 VITALS
TEMPERATURE: 97.5 F | RESPIRATION RATE: 16 BRPM | DIASTOLIC BLOOD PRESSURE: 68 MMHG | HEART RATE: 56 BPM | SYSTOLIC BLOOD PRESSURE: 148 MMHG | OXYGEN SATURATION: 92 % | BODY MASS INDEX: 38.88 KG/M2 | WEIGHT: 192.5 LBS

## 2024-09-26 DIAGNOSIS — K42.9 UMBILICAL HERNIA WITHOUT OBSTRUCTION OR GANGRENE: Primary | ICD-10-CM

## 2024-09-26 PROCEDURE — 360N000080 HC SURGERY LEVEL 7, PER MIN: Performed by: SPECIALIST

## 2024-09-26 PROCEDURE — 999N000141 HC STATISTIC PRE-PROCEDURE NURSING ASSESSMENT: Performed by: SPECIALIST

## 2024-09-26 PROCEDURE — 258N000003 HC RX IP 258 OP 636: Performed by: ANESTHESIOLOGY

## 2024-09-26 PROCEDURE — 250N000009 HC RX 250

## 2024-09-26 PROCEDURE — 250N000011 HC RX IP 250 OP 636

## 2024-09-26 PROCEDURE — 49591 RPR AA HRN 1ST < 3 CM RDC: CPT | Performed by: SPECIALIST

## 2024-09-26 PROCEDURE — 49591 RPR AA HRN 1ST < 3 CM RDC: CPT | Performed by: ANESTHESIOLOGY

## 2024-09-26 PROCEDURE — S2900 ROBOTIC SURGICAL SYSTEM: HCPCS | Performed by: SPECIALIST

## 2024-09-26 PROCEDURE — 250N000011 HC RX IP 250 OP 636: Performed by: SPECIALIST

## 2024-09-26 PROCEDURE — 250N000025 HC SEVOFLURANE, PER MIN: Performed by: SPECIALIST

## 2024-09-26 PROCEDURE — 710N000012 HC RECOVERY PHASE 2, PER MINUTE: Performed by: SPECIALIST

## 2024-09-26 PROCEDURE — 710N000009 HC RECOVERY PHASE 1, LEVEL 1, PER MIN: Performed by: SPECIALIST

## 2024-09-26 PROCEDURE — 99100 ANES PT EXTEME AGE<1 YR&>70: CPT

## 2024-09-26 PROCEDURE — C1781 MESH (IMPLANTABLE): HCPCS | Performed by: SPECIALIST

## 2024-09-26 PROCEDURE — 370N000017 HC ANESTHESIA TECHNICAL FEE, PER MIN: Performed by: SPECIALIST

## 2024-09-26 PROCEDURE — 250N000013 HC RX MED GY IP 250 OP 250 PS 637: Performed by: SPECIALIST

## 2024-09-26 PROCEDURE — 250N000011 HC RX IP 250 OP 636: Performed by: ANESTHESIOLOGY

## 2024-09-26 PROCEDURE — 258N000003 HC RX IP 258 OP 636

## 2024-09-26 PROCEDURE — 49591 RPR AA HRN 1ST < 3 CM RDC: CPT

## 2024-09-26 PROCEDURE — 250N000013 HC RX MED GY IP 250 OP 250 PS 637: Performed by: ANESTHESIOLOGY

## 2024-09-26 PROCEDURE — 272N000001 HC OR GENERAL SUPPLY STERILE: Performed by: SPECIALIST

## 2024-09-26 DEVICE — COMPOSITE MESH MONOFILAMENT POLYPROPYLENE MESH WITH ABSORBABLE SYNTHETIC FILM AND MARKING
Type: IMPLANTABLE DEVICE | Site: UMBILICAL | Status: FUNCTIONAL
Brand: PARIETENE DS

## 2024-09-26 RX ORDER — OXYCODONE HYDROCHLORIDE 5 MG/1
10 TABLET ORAL
Status: DISCONTINUED | OUTPATIENT
Start: 2024-09-26 | End: 2024-09-26 | Stop reason: HOSPADM

## 2024-09-26 RX ORDER — FENTANYL CITRATE 50 UG/ML
INJECTION, SOLUTION INTRAMUSCULAR; INTRAVENOUS PRN
Status: DISCONTINUED | OUTPATIENT
Start: 2024-09-26 | End: 2024-09-26

## 2024-09-26 RX ORDER — NALOXONE HYDROCHLORIDE 0.4 MG/ML
0.1 INJECTION, SOLUTION INTRAMUSCULAR; INTRAVENOUS; SUBCUTANEOUS
Status: DISCONTINUED | OUTPATIENT
Start: 2024-09-26 | End: 2024-09-26 | Stop reason: HOSPADM

## 2024-09-26 RX ORDER — BUPIVACAINE HYDROCHLORIDE 2.5 MG/ML
INJECTION, SOLUTION EPIDURAL; INFILTRATION; INTRACAUDAL PRN
Status: DISCONTINUED | OUTPATIENT
Start: 2024-09-26 | End: 2024-09-26 | Stop reason: HOSPADM

## 2024-09-26 RX ORDER — DEXAMETHASONE SODIUM PHOSPHATE 4 MG/ML
4 INJECTION, SOLUTION INTRA-ARTICULAR; INTRALESIONAL; INTRAMUSCULAR; INTRAVENOUS; SOFT TISSUE
Status: DISCONTINUED | OUTPATIENT
Start: 2024-09-26 | End: 2024-09-26 | Stop reason: HOSPADM

## 2024-09-26 RX ORDER — ACETAMINOPHEN 325 MG/1
975 TABLET ORAL ONCE
Status: COMPLETED | OUTPATIENT
Start: 2024-09-26 | End: 2024-09-26

## 2024-09-26 RX ORDER — FENTANYL CITRATE 50 UG/ML
25 INJECTION, SOLUTION INTRAMUSCULAR; INTRAVENOUS
Status: DISCONTINUED | OUTPATIENT
Start: 2024-09-26 | End: 2024-09-26 | Stop reason: HOSPADM

## 2024-09-26 RX ORDER — CEFAZOLIN SODIUM/WATER 2 G/20 ML
2 SYRINGE (ML) INTRAVENOUS
Status: COMPLETED | OUTPATIENT
Start: 2024-09-26 | End: 2024-09-26

## 2024-09-26 RX ORDER — HYDROCODONE BITARTRATE AND ACETAMINOPHEN 5; 325 MG/1; MG/1
1-2 TABLET ORAL EVERY 6 HOURS PRN
Qty: 18 TABLET | Refills: 0 | Status: SHIPPED | OUTPATIENT
Start: 2024-09-26 | End: 2024-09-29

## 2024-09-26 RX ORDER — HYDROMORPHONE HCL IN WATER/PF 6 MG/30 ML
0.4 PATIENT CONTROLLED ANALGESIA SYRINGE INTRAVENOUS EVERY 5 MIN PRN
Status: DISCONTINUED | OUTPATIENT
Start: 2024-09-26 | End: 2024-09-26 | Stop reason: HOSPADM

## 2024-09-26 RX ORDER — ONDANSETRON 2 MG/ML
4 INJECTION INTRAMUSCULAR; INTRAVENOUS EVERY 30 MIN PRN
Status: DISCONTINUED | OUTPATIENT
Start: 2024-09-26 | End: 2024-09-26 | Stop reason: HOSPADM

## 2024-09-26 RX ORDER — ACETAMINOPHEN 325 MG/1
975 TABLET ORAL ONCE
Status: DISCONTINUED | OUTPATIENT
Start: 2024-09-26 | End: 2024-09-26 | Stop reason: HOSPADM

## 2024-09-26 RX ORDER — CEFAZOLIN SODIUM/WATER 2 G/20 ML
2 SYRINGE (ML) INTRAVENOUS SEE ADMIN INSTRUCTIONS
Status: DISCONTINUED | OUTPATIENT
Start: 2024-09-26 | End: 2024-09-26 | Stop reason: HOSPADM

## 2024-09-26 RX ORDER — HYDROMORPHONE HCL IN WATER/PF 6 MG/30 ML
0.2 PATIENT CONTROLLED ANALGESIA SYRINGE INTRAVENOUS EVERY 5 MIN PRN
Status: DISCONTINUED | OUTPATIENT
Start: 2024-09-26 | End: 2024-09-26 | Stop reason: HOSPADM

## 2024-09-26 RX ORDER — FENTANYL CITRATE 50 UG/ML
50 INJECTION, SOLUTION INTRAMUSCULAR; INTRAVENOUS EVERY 5 MIN PRN
Status: DISCONTINUED | OUTPATIENT
Start: 2024-09-26 | End: 2024-09-26 | Stop reason: HOSPADM

## 2024-09-26 RX ORDER — DEXAMETHASONE SODIUM PHOSPHATE 10 MG/ML
4 INJECTION, SOLUTION INTRAMUSCULAR; INTRAVENOUS
Status: DISCONTINUED | OUTPATIENT
Start: 2024-09-26 | End: 2024-09-26 | Stop reason: HOSPADM

## 2024-09-26 RX ORDER — LIDOCAINE HYDROCHLORIDE 10 MG/ML
INJECTION, SOLUTION INFILTRATION; PERINEURAL PRN
Status: DISCONTINUED | OUTPATIENT
Start: 2024-09-26 | End: 2024-09-26

## 2024-09-26 RX ORDER — PROPOFOL 10 MG/ML
INJECTION, EMULSION INTRAVENOUS PRN
Status: DISCONTINUED | OUTPATIENT
Start: 2024-09-26 | End: 2024-09-26

## 2024-09-26 RX ORDER — FENTANYL CITRATE 50 UG/ML
25 INJECTION, SOLUTION INTRAMUSCULAR; INTRAVENOUS EVERY 5 MIN PRN
Status: DISCONTINUED | OUTPATIENT
Start: 2024-09-26 | End: 2024-09-26 | Stop reason: HOSPADM

## 2024-09-26 RX ORDER — SODIUM CHLORIDE, SODIUM LACTATE, POTASSIUM CHLORIDE, CALCIUM CHLORIDE 600; 310; 30; 20 MG/100ML; MG/100ML; MG/100ML; MG/100ML
INJECTION, SOLUTION INTRAVENOUS CONTINUOUS PRN
Status: DISCONTINUED | OUTPATIENT
Start: 2024-09-26 | End: 2024-09-26

## 2024-09-26 RX ORDER — ONDANSETRON 2 MG/ML
INJECTION INTRAMUSCULAR; INTRAVENOUS PRN
Status: DISCONTINUED | OUTPATIENT
Start: 2024-09-26 | End: 2024-09-26

## 2024-09-26 RX ORDER — SODIUM CHLORIDE, SODIUM LACTATE, POTASSIUM CHLORIDE, CALCIUM CHLORIDE 600; 310; 30; 20 MG/100ML; MG/100ML; MG/100ML; MG/100ML
INJECTION, SOLUTION INTRAVENOUS CONTINUOUS
Status: DISCONTINUED | OUTPATIENT
Start: 2024-09-26 | End: 2024-09-26 | Stop reason: HOSPADM

## 2024-09-26 RX ORDER — OXYCODONE HYDROCHLORIDE 5 MG/1
5 TABLET ORAL
Status: COMPLETED | OUTPATIENT
Start: 2024-09-26 | End: 2024-09-26

## 2024-09-26 RX ORDER — ACETAMINOPHEN 325 MG/1
975 TABLET ORAL ONCE
Status: DISCONTINUED | OUTPATIENT
Start: 2024-09-26 | End: 2024-09-26

## 2024-09-26 RX ORDER — ONDANSETRON 4 MG/1
4 TABLET, ORALLY DISINTEGRATING ORAL EVERY 30 MIN PRN
Status: DISCONTINUED | OUTPATIENT
Start: 2024-09-26 | End: 2024-09-26 | Stop reason: HOSPADM

## 2024-09-26 RX ORDER — LIDOCAINE 40 MG/G
CREAM TOPICAL
Status: DISCONTINUED | OUTPATIENT
Start: 2024-09-26 | End: 2024-09-26 | Stop reason: HOSPADM

## 2024-09-26 RX ORDER — DEXAMETHASONE SODIUM PHOSPHATE 4 MG/ML
INJECTION, SOLUTION INTRA-ARTICULAR; INTRALESIONAL; INTRAMUSCULAR; INTRAVENOUS; SOFT TISSUE PRN
Status: DISCONTINUED | OUTPATIENT
Start: 2024-09-26 | End: 2024-09-26

## 2024-09-26 RX ORDER — EPHEDRINE SULFATE 50 MG/ML
INJECTION, SOLUTION INTRAMUSCULAR; INTRAVENOUS; SUBCUTANEOUS PRN
Status: DISCONTINUED | OUTPATIENT
Start: 2024-09-26 | End: 2024-09-26

## 2024-09-26 RX ADMIN — FENTANYL CITRATE 50 MCG: 50 INJECTION INTRAMUSCULAR; INTRAVENOUS at 11:14

## 2024-09-26 RX ADMIN — FENTANYL CITRATE 50 MCG: 50 INJECTION INTRAMUSCULAR; INTRAVENOUS at 11:43

## 2024-09-26 RX ADMIN — SODIUM CHLORIDE, POTASSIUM CHLORIDE, SODIUM LACTATE AND CALCIUM CHLORIDE: 600; 310; 30; 20 INJECTION, SOLUTION INTRAVENOUS at 10:53

## 2024-09-26 RX ADMIN — ROCURONIUM BROMIDE 20 MG: 50 INJECTION, SOLUTION INTRAVENOUS at 11:32

## 2024-09-26 RX ADMIN — Medication 10 MG: at 11:31

## 2024-09-26 RX ADMIN — SUGAMMADEX 200 MG: 100 INJECTION, SOLUTION INTRAVENOUS at 12:21

## 2024-09-26 RX ADMIN — ACETAMINOPHEN 975 MG: 325 TABLET ORAL at 10:56

## 2024-09-26 RX ADMIN — LIDOCAINE HYDROCHLORIDE 5 ML: 10 INJECTION, SOLUTION INFILTRATION; PERINEURAL at 11:14

## 2024-09-26 RX ADMIN — PROPOFOL 140 MG: 10 INJECTION, EMULSION INTRAVENOUS at 11:14

## 2024-09-26 RX ADMIN — DEXAMETHASONE SODIUM PHOSPHATE 4 MG: 4 INJECTION, SOLUTION INTRA-ARTICULAR; INTRALESIONAL; INTRAMUSCULAR; INTRAVENOUS; SOFT TISSUE at 11:14

## 2024-09-26 RX ADMIN — SODIUM CHLORIDE, POTASSIUM CHLORIDE, SODIUM LACTATE AND CALCIUM CHLORIDE: 600; 310; 30; 20 INJECTION, SOLUTION INTRAVENOUS at 11:07

## 2024-09-26 RX ADMIN — FENTANYL CITRATE 25 MCG: 50 INJECTION, SOLUTION INTRAMUSCULAR; INTRAVENOUS at 12:47

## 2024-09-26 RX ADMIN — ONDANSETRON 4 MG: 2 INJECTION INTRAMUSCULAR; INTRAVENOUS at 12:12

## 2024-09-26 RX ADMIN — Medication 2 G: at 11:20

## 2024-09-26 RX ADMIN — ROCURONIUM BROMIDE 50 MG: 50 INJECTION, SOLUTION INTRAVENOUS at 11:14

## 2024-09-26 RX ADMIN — OXYCODONE HYDROCHLORIDE 5 MG: 5 TABLET ORAL at 13:07

## 2024-09-26 ASSESSMENT — ACTIVITIES OF DAILY LIVING (ADL)
ADLS_ACUITY_SCORE: 23
ADLS_ACUITY_SCORE: 22
ADLS_ACUITY_SCORE: 23
ADLS_ACUITY_SCORE: 23
ADLS_ACUITY_SCORE: 18
ADLS_ACUITY_SCORE: 22

## 2024-09-26 NOTE — ANESTHESIA CARE TRANSFER NOTE
Patient: Natasha Thomas    Procedure: Procedure(s):  HERNIORRHAPHY, UMBILICAL, ROBOT-ASSISTED, LAPAROSCOPIC, USING DA XI XI       Diagnosis: Umbilical hernia without obstruction and without gangrene [K42.9]  Diagnosis Additional Information: No value filed.    Anesthesia Type:   General     Note:    Oropharynx: oropharynx clear of all foreign objects  Level of Consciousness: awake  Oxygen Supplementation: room air    Independent Airway: airway patency satisfactory and stable  Dentition: dentition unchanged  Vital Signs Stable: post-procedure vital signs reviewed and stable  Report to RN Given: handoff report given  Patient transferred to: PACU    Handoff Report: Identifed the Patient, Identified the Reponsible Provider, Reviewed the pertinent medical history, Discussed the surgical course, Reviewed Intra-OP anesthesia mangement and issues during anesthesia, Set expectations for post-procedure period and Allowed opportunity for questions and acknowledgement of understanding      Vitals:  Vitals Value Taken Time   /68 09/26/24 1231   Temp     Pulse 55 09/26/24 1231   Resp 25 09/26/24 1231   SpO2 95 % 09/26/24 1231   Vitals shown include unfiled device data.    Electronically Signed By: WENDY Nguyen CRNA  September 26, 2024  12:33 PM

## 2024-09-26 NOTE — INTERVAL H&P NOTE
"I have reviewed the surgical (or preoperative) H&P that is linked to this encounter, and examined the patient. There are no significant changes.        Uri Daily MD  General Surgery [phone]  Vascular Surgery [phone]          Clinical Conditions Present on Arrival:  Clinically Significant Risk Factors Present on Admission                      # Obesity: Estimated body mass index is 38.88 kg/m  as calculated from the following:    Height as of 9/19/24: 1.499 m (4' 11\").    Weight as of this encounter: 87.3 kg (192 lb 8 oz).       "

## 2024-09-26 NOTE — ANESTHESIA PROCEDURE NOTES
Airway       Patient location during procedure: OR       Procedure Start/Stop Times: 9/26/2024 11:16 AM  Staff -        CRNA: Humberto Allen APRN CRNA       Performed By: CRNAIndications and Patient Condition       Indications for airway management: tonny-procedural         Mask difficulty assessment: 1 - vent by mask    Final Airway Details       Final airway type: endotracheal airway       Successful airway: ETT - single  Endotracheal Airway Details        ETT size (mm): 7.0       Cuffed: yes       Successful intubation technique: direct laryngoscopy       DL Blade Type: Montano 2       Grade View of Cords: 1       Adjucts: stylet    Post intubation assessment        Placement verified by: capnometry and equal breath sounds        Number of attempts at approach: 1       Ease of procedure: easy    Medication(s) Administered   Medication Administration Time: 9/26/2024 11:16 AM

## 2024-09-26 NOTE — ANESTHESIA POSTPROCEDURE EVALUATION
Patient: Natasha Thomas    Procedure: Procedure(s):  HERNIORRHAPHY, UMBILICAL, ROBOT-ASSISTED, LAPAROSCOPIC, USING DA XI XI       Anesthesia Type:  General    Note:  Disposition: Outpatient   Postop Pain Control: Uneventful            Sign Out: Well controlled pain   PONV: No   Neuro/Psych: Uneventful            Sign Out: Acceptable/Baseline neuro status   Airway/Respiratory: Uneventful            Sign Out: Acceptable/Baseline resp. status   CV/Hemodynamics: Uneventful            Sign Out: Acceptable CV status; No obvious hypovolemia; No obvious fluid overload   Other NRE: NONE   DID A NON-ROUTINE EVENT OCCUR?            Last vitals:  Vitals Value Taken Time   /67 09/26/24 1330   Temp 36.6  C (97.8  F) 09/26/24 1300   Pulse 52 09/26/24 1331   Resp 14 09/26/24 1313   SpO2 95 % 09/26/24 1331   Vitals shown include unfiled device data.    Electronically Signed By: Nuris Duff MD  September 26, 2024  1:54 PM

## 2024-09-26 NOTE — OP NOTE
Operative Note    Name:  Natasha GUERRIER William  PCP:  Niraj Biswas  Procedure Date:  9/26/2024      Procedure(s):  HERNIORRHAPHY, UMBILICAL, ROBOT-ASSISTED, LAPAROSCOPIC, USING DA XI XI wih mesh    Pre-Procedure Diagnosis:  Umbilical hernia without obstruction and without gangrene [K42.9]    Post-Procedure Diagnosis:    umbilical hernia    OR staff:  Surgeon(s):  Uri Daily MD    Circulator: Diane Vazquez RN  Relief Circulator: Diane Preston RN  Relief Scrub: Barbara Asher  Scrub Person: Cristina Chaparro      Anesthesia Type:  General    Past Medical History:   Diagnosis Date    Constipation     External hemorrhoids     Gastroesophageal reflux disease     High cholesterol     History of blood transfusion     Hypertension     Infection due to 2019 novel coronavirus     january 2023 - PAXLOVID    Obese     Pancreatitis     Thyroid disease     Ulcerative colitis (H)        Patient Active Problem List    Diagnosis Date Noted    Chronic constipation 03/12/2024     Priority: Medium    Left lower quadrant pain 12/08/2022     Priority: Medium    Diarrhea 12/08/2022     Priority: Medium    Overweight 12/06/2022     Priority: Medium     Formatting of this note might be different from the original.  Created by Conversion      Hypothyroidism 12/06/2022     Priority: Medium     Formatting of this note might be different from the original.  Created by Conversion    Replacement Utility updated for latest IMO load      External hemorrhoids 12/06/2022     Priority: Medium     Formatting of this note might be different from the original.  Created by Conversion    Replacement Utility updated for latest IMO load      Essential hematuria 12/06/2022     Priority: Medium     Formatting of this note might be different from the original.  Created by Conversion      Chronic recurrent pancreatitis (H) 12/06/2022     Priority: Medium     Formatting of this note might be different from the original.  Created by  Conversion      Benign essential hypertension 12/06/2022     Priority: Medium     Formatting of this note might be different from the original.  Created by Conversion      Ulcerative colitis (H) 12/02/2019     Priority: Medium    Benign neoplasm of colon 12/02/2019     Priority: Medium    Polyp of colon 11/27/2019     Priority: Medium    Morbid obesity (H) 12/14/2018     Priority: Medium    Prediabetes 12/14/2018     Priority: Medium     Formatting of this note might be different from the original.  A1c 5.9% on 12/14/18      Noninfectious gastroenteritis 10/23/2017     Priority: Medium    Diverticulosis of colon 05/24/2011     Priority: Medium    Cyst and pseudocyst of pancreas 09/24/2008     Priority: Medium       Findings:  2 cm umbilical hernia  12 cm parietex mesh placed    Operative Report:    Consent was obtained.  The patient was taken to the operating room and placed in a supine position.  General anesthesia was administered by the anesthesia staff.  A Glover was placed.  Abdomen prepped and draped sterile manner.  Briefing and timeout is performed anesthesia and our staff.  Made incision to the subcostal margin left side used a 5 mm scope 5 mm rigid Visiport to gain access to peritoneal cavity insufflated pressure 15 mL mercury CO2 point during the stay placed a 8 mm robotic trocar on the left lateral flank and left lower quadrant diffusely.  I then changed my 5 mm trocar over to 08 mm robotic trocar and docked the robot.  At this time point and change my position to the console.  During induction and the Glover placed to reduce the hernia so this was reduced completely.  We at this time point repaired the defect primarily with a 2 oh V-Loc permanent suture I then used a 12 cm pariah Tj for dual layer mesh this was placed intraperitoneally along with two 2-0 absorbable V-Loc sutures I sutured the mesh into place along the peripherally using a 2-0 sutures upon completion this removed all the needles.  Trocars  and CO2 and gas was removed.  I closed the incisions with a 4-0 Monocryl subcuticular stitch.  Injected 40 Marcaine and incisions in the field block of the umbilical region.  I then also Steri-Strip sterile dressings applied and cotton balls were placed the umbilicus and dressing was applied.  She will be put into abdominal binder she was extubated transferred to PACU in stable condition.    All sponge and needle counts were correct.    Estimated Blood Loss: * No Blood documented between In Roon and Out of Room log events - 9/26/2024 11:06 AM to 9/26/2024 12:27 PM *    Specimens:    none       Drains:   none    Complications:    None    Uri Daily MD     Date: 9/26/2024  Time: 12:31 PM

## 2024-09-26 NOTE — ANESTHESIA PREPROCEDURE EVALUATION
Anesthesia Pre-Procedure Evaluation    Patient: Natasha Thomas   MRN: [id number] : 1945        Procedure : Procedure(s):  HERNIORRHAPHY, UMBILICAL, ROBOT-ASSISTED, LAPAROSCOPIC, USING DA XI XI          Past Medical History:   Diagnosis Date    Constipation     External hemorrhoids     Gastroesophageal reflux disease     High cholesterol     History of blood transfusion     Hypertension     Infection due to  novel coronavirus     2023 - PAXLOVID    Obese     Pancreatitis     Thyroid disease     Ulcerative colitis (H)       Past Surgical History:   Procedure Laterality Date    BACK SURGERY      CHOLECYSTECTOMY      HERNIA REPAIR      IR MISCELLANEOUS PROCEDURE  10/01/2009    JOINT REPLACEMENT      ORTHOPEDIC SURGERY      hip    SPINE SURGERY      SPLENECTOMY      age 6      Allergies   Allergen Reactions    Penicillins Hives, Itching and Rash      Social History     Tobacco Use    Smoking status: Former     Current packs/day: 0.00     Types: Cigarettes     Quit date: 1997     Years since quittin.7    Smokeless tobacco: Never   Substance Use Topics    Alcohol use: No     Comment: Alcoholic Drinks/day: very rre social drink      Wt Readings from Last 1 Encounters:   24 88.7 kg (195 lb 9.6 oz)        Anesthesia Evaluation            ROS/MED HX  ENT/Pulmonary:  - neg pulmonary ROS     Neurologic:  - neg neurologic ROS     Cardiovascular:     (+)  hypertension- -   -  - -                                      METS/Exercise Tolerance:     Hematologic:  - neg hematologic  ROS     Musculoskeletal:  - neg musculoskeletal ROS     GI/Hepatic:     (+)       Inflammatory bowel disease,             Renal/Genitourinary:  - neg Renal ROS     Endo:     (+)               Obesity,       Psychiatric/Substance Use:  - neg psychiatric ROS     Infectious Disease:       Malignancy:       Other:            Physical Exam    Airway  airway exam normal      Mallampati: II   TM distance: > 3 FB   Neck ROM:  "full   Mouth opening: > 3 cm    Respiratory Devices and Support         Dental       (+) Modest Abnormalities - crowns, retainers, 1 or 2 missing teeth      Cardiovascular   cardiovascular exam normal          Pulmonary   pulmonary exam normal                OUTSIDE LABS:  CBC:   Lab Results   Component Value Date    WBC 8.6 09/19/2024    WBC 10.1 03/14/2024    HGB 14.0 09/19/2024    HGB 14.4 03/14/2024    HCT 41.5 09/19/2024    HCT 43.0 03/14/2024     09/19/2024     (H) 03/14/2024     BMP:   Lab Results   Component Value Date     09/19/2024     03/14/2024    POTASSIUM 4.6 09/19/2024    POTASSIUM 4.7 03/14/2024    CHLORIDE 105 09/19/2024    CHLORIDE 104 03/14/2024    CO2 27 09/19/2024    CO2 28 03/14/2024    BUN 18.5 09/19/2024    BUN 17.7 03/14/2024    CR 0.68 09/19/2024    CR 0.6 08/07/2024    GLC 85 09/19/2024     (H) 03/14/2024     COAGS:   Lab Results   Component Value Date    PTT 35 02/23/2018    INR 1.01 02/23/2018     POC: No results found for: \"BGM\", \"HCG\", \"HCGS\"  HEPATIC:   Lab Results   Component Value Date    ALBUMIN 4.0 03/14/2024    PROTTOTAL 7.2 03/14/2024    ALT 17 03/14/2024    AST 52 (H) 03/14/2024    ALKPHOS 97 03/14/2024    BILITOTAL 0.6 03/14/2024     OTHER:   Lab Results   Component Value Date    A1C 5.9 12/14/2018    KELSY 9.9 09/19/2024    MAG 2.1 03/14/2024    AMYLASE 54 03/14/2024    TSH 0.82 12/08/2022       Anesthesia Plan    ASA Status:  2    NPO Status:  NPO Appropriate    Anesthesia Type: General.     - Airway: ETT   Induction: Intravenous.   Maintenance: Balanced.        Consents    Anesthesia Plan(s) and associated risks, benefits, and realistic alternatives discussed. Questions answered and patient/representative(s) expressed understanding.     - Discussed:     - Discussed with:  Patient      - Extended Intubation/Ventilatory Support Discussed: No.      - Patient is DNR/DNI Status: No          Postoperative Care    Pain management: Multi-modal " "analgesia.   PONV prophylaxis: Ondansetron (or other 5HT-3), Dexamethasone or Solumedrol     Comments:               Nuris Duff MD    I have reviewed the pertinent notes and labs in the chart from the past 30 days and (re)examined the patient.  Any updates or changes from those notes are reflected in this note.              # Obesity: Estimated body mass index is 39.51 kg/m  as calculated from the following:    Height as of 9/19/24: 1.499 m (4' 11\").    Weight as of 9/19/24: 88.7 kg (195 lb 9.6 oz).      "

## 2024-10-03 ENCOUNTER — TELEPHONE (OUTPATIENT)
Dept: SURGERY | Facility: CLINIC | Age: 79
End: 2024-10-03
Payer: COMMERCIAL

## 2024-10-08 DIAGNOSIS — I10 BENIGN ESSENTIAL HYPERTENSION: ICD-10-CM

## 2024-10-09 ENCOUNTER — DOCUMENTATION ONLY (OUTPATIENT)
Dept: SURGERY | Facility: CLINIC | Age: 79
End: 2024-10-09
Payer: COMMERCIAL

## 2024-10-09 RX ORDER — CARVEDILOL 12.5 MG/1
TABLET ORAL
Qty: 180 TABLET | Refills: 0 | Status: SHIPPED | OUTPATIENT
Start: 2024-10-09

## 2024-10-09 NOTE — PROGRESS NOTES
This [facility] paperwork has been re-faxed to the number provided by patient. Faxed to [phone] with the Attn: Mandi Sparks.            Federal Family and Medical Leave Act forms received, completed and signed on providers behalf.    Leave start date: 09/26/2024    Leave end date: 10/14/2024    RTW on 10/15/2024 with no restrictions.     Forms faxed to: [phone], attn: Sachin flores [place].    Fax successful.     Patient has a post-op with [facility] on 10/14/2024 and will  her forms then. Pt will also discuss if an extension is needed.    Forms labeled and sent to HIM for scanning.    Gokul Macias  [facility]  Surgery Clinic [facility]  Weight Management Clinic [address] [address]  Office: [phone]  Fax: [phone]

## 2024-10-14 ENCOUNTER — OFFICE VISIT (OUTPATIENT)
Dept: SURGERY | Facility: CLINIC | Age: 79
End: 2024-10-14
Payer: COMMERCIAL

## 2024-10-14 DIAGNOSIS — K42.9 UMBILICAL HERNIA WITHOUT OBSTRUCTION AND WITHOUT GANGRENE: Primary | ICD-10-CM

## 2024-10-14 PROCEDURE — 99212 OFFICE O/P EST SF 10 MIN: CPT | Performed by: SPECIALIST

## 2024-10-14 NOTE — PROGRESS NOTES
HPI: Pt is here for follow up of a robotic ventral hernia repair.  She is doing well. Her appetite is good, and bowel function regular.  No fevers or chills. Ambulating without problems.       LMP  (LMP Unknown)       EXAM: This is a  79 year old WOMAN in no distress  GENERAL: Appears well  CHEST clear  CVS S1S2 NSR  ABDOMEN: Soft, non-tender. Seroma at hernia site but repair intact, lateral incisions look great.   EXT: warm, moves without difficulty         Assessment/Plan:   S/P robotic ventral hernia repair.    Doing well. Paper work has been sent  Return to activities and lifting.   Follow up with questions or issues.    Uri Daily MD ,MD  [facility] Department of Surgery

## 2024-10-14 NOTE — LETTER
10/14/2024      Natasha Thomas  [address] [place] [address]      Dear Colleague,    Thank you for referring your patient, Natasha Thomas, to the [facility] AND [facility]ATR[facility] [place]. Please see a copy of my visit note below.    HPI: Pt is here for follow up of a robotic ventral hernia repair.  She is doing well. Her appetite is good, and bowel function regular.  No fevers or chills. Ambulating without problems.       LMP  (LMP Unknown)       EXAM: This is a  79 year old WOMAN in no distress  GENERAL: Appears well  CHEST clear  CVS S1S2 NSR  ABDOMEN: Soft, non-tender. Seroma at hernia site but repair intact, lateral incisions look great.   EXT: warm, moves without difficulty         Assessment/Plan:   S/P robotic ventral hernia repair.    Doing well. Paper work has been sent  Return to activities and lifting.   Follow up with questions or issues.    Uri Daily MD ,MD  [facility] Department of Surgery      Again, thank you for allowing me to participate in the care of your patient.        Sincerely,        Uri Daily MD

## 2024-10-23 ENCOUNTER — TELEPHONE (OUTPATIENT)
Dept: SURGERY | Facility: CLINIC | Age: 79
End: 2024-10-23

## 2024-10-23 NOTE — TELEPHONE ENCOUNTER
Patient calling today stating she received a call from employer that [employer] paperwork not received. I read back to her what was faxed per notes. She states it was the wrong fax number. Please fax again to [phone] attn: Mandi Sparks, also return to work should be 10/16/24.    Thank you

## 2024-11-22 ENCOUNTER — TRANSFERRED RECORDS (OUTPATIENT)
Dept: HEALTH INFORMATION MANAGEMENT | Facility: CLINIC | Age: 79
End: 2024-11-22
Payer: COMMERCIAL

## 2025-04-22 DIAGNOSIS — I10 BENIGN ESSENTIAL HYPERTENSION: ICD-10-CM

## 2025-04-22 RX ORDER — CARVEDILOL 12.5 MG/1
TABLET ORAL
Qty: 180 TABLET | Refills: 0 | Status: SHIPPED | OUTPATIENT
Start: 2025-04-22

## 2025-04-22 RX ORDER — AMLODIPINE BESYLATE 5 MG/1
5 TABLET ORAL DAILY
Qty: 90 TABLET | Refills: 0 | Status: SHIPPED | OUTPATIENT
Start: 2025-04-22

## 2025-05-21 ENCOUNTER — OFFICE VISIT (OUTPATIENT)
Dept: FAMILY MEDICINE | Facility: CLINIC | Age: 80
End: 2025-05-21
Payer: COMMERCIAL

## 2025-05-21 VITALS
OXYGEN SATURATION: 96 % | RESPIRATION RATE: 20 BRPM | DIASTOLIC BLOOD PRESSURE: 80 MMHG | HEIGHT: 59 IN | HEART RATE: 59 BPM | SYSTOLIC BLOOD PRESSURE: 138 MMHG | WEIGHT: 200.9 LBS | BODY MASS INDEX: 40.5 KG/M2 | TEMPERATURE: 97.8 F

## 2025-05-21 DIAGNOSIS — K86.1 CHRONIC RECURRENT PANCREATITIS (H): Primary | ICD-10-CM

## 2025-05-21 DIAGNOSIS — J45.20 MILD INTERMITTENT ASTHMA, UNSPECIFIED WHETHER COMPLICATED: ICD-10-CM

## 2025-05-21 DIAGNOSIS — E06.3 HASHIMOTO'S THYROIDITIS: ICD-10-CM

## 2025-05-21 DIAGNOSIS — E66.01 MORBID OBESITY (H): ICD-10-CM

## 2025-05-21 DIAGNOSIS — K51.919 ULCERATIVE COLITIS WITH COMPLICATION, UNSPECIFIED LOCATION (H): ICD-10-CM

## 2025-05-21 DIAGNOSIS — K51.30 ULCERATIVE RECTOSIGMOIDITIS WITHOUT COMPLICATION (H): ICD-10-CM

## 2025-05-21 LAB
ERYTHROCYTE [DISTWIDTH] IN BLOOD BY AUTOMATED COUNT: 11.8 % (ref 10–15)
HCT VFR BLD AUTO: 45.3 % (ref 35–47)
HGB BLD-MCNC: 15.3 G/DL (ref 11.7–15.7)
MCH RBC QN AUTO: 30 PG (ref 26.5–33)
MCHC RBC AUTO-ENTMCNC: 33.8 G/DL (ref 31.5–36.5)
MCV RBC AUTO: 89 FL (ref 78–100)
PLATELET # BLD AUTO: 440 10E3/UL (ref 150–450)
RBC # BLD AUTO: 5.1 10E6/UL (ref 3.8–5.2)
WBC # BLD AUTO: 9.6 10E3/UL (ref 4–11)

## 2025-05-21 PROCEDURE — 99214 OFFICE O/P EST MOD 30 MIN: CPT | Performed by: FAMILY MEDICINE

## 2025-05-21 PROCEDURE — 80061 LIPID PANEL: CPT | Performed by: FAMILY MEDICINE

## 2025-05-21 PROCEDURE — 84443 ASSAY THYROID STIM HORMONE: CPT | Performed by: FAMILY MEDICINE

## 2025-05-21 PROCEDURE — 80053 COMPREHEN METABOLIC PANEL: CPT | Performed by: FAMILY MEDICINE

## 2025-05-21 PROCEDURE — 36415 COLL VENOUS BLD VENIPUNCTURE: CPT | Performed by: FAMILY MEDICINE

## 2025-05-21 PROCEDURE — 3079F DIAST BP 80-89 MM HG: CPT | Performed by: FAMILY MEDICINE

## 2025-05-21 PROCEDURE — 85027 COMPLETE CBC AUTOMATED: CPT | Performed by: FAMILY MEDICINE

## 2025-05-21 PROCEDURE — 3075F SYST BP GE 130 - 139MM HG: CPT | Performed by: FAMILY MEDICINE

## 2025-05-21 PROCEDURE — 1126F AMNT PAIN NOTED NONE PRSNT: CPT | Performed by: FAMILY MEDICINE

## 2025-05-21 RX ORDER — ALBUTEROL SULFATE 90 UG/1
2 INHALANT RESPIRATORY (INHALATION) EVERY 6 HOURS PRN
Qty: 18 G | Refills: 3 | Status: SHIPPED | OUTPATIENT
Start: 2025-05-21

## 2025-05-21 SDOH — HEALTH STABILITY: PHYSICAL HEALTH: ON AVERAGE, HOW MANY DAYS PER WEEK DO YOU ENGAGE IN MODERATE TO STRENUOUS EXERCISE (LIKE A BRISK WALK)?: 0 DAYS

## 2025-05-21 ASSESSMENT — ACTIVITIES OF DAILY LIVING (ADL)
CONCENTRATING,_REMEMBERING_OR_MAKING_DECISIONS_DIFFICULTY: NO
EQUIPMENT_CURRENTLY_USED_AT_HOME: CANE, QUAD;CANE, STRAIGHT
DIFFICULTY_COMMUNICATING: NO
WALKING_OR_CLIMBING_STAIRS_DIFFICULTY: YES
DRESSING/BATHING_DIFFICULTY: NO
TOILETING_ISSUES: NO
DOING_ERRANDS_INDEPENDENTLY_DIFFICULTY: NO
DIFFICULTY_EATING/SWALLOWING: NO
HEARING_DIFFICULTY_OR_DEAF: NO
CHANGE_IN_FUNCTIONAL_STATUS_SINCE_ONSET_OF_CURRENT_ILLNESS/INJURY: NO
WEAR_GLASSES_OR_BLIND: YES
FALL_HISTORY_WITHIN_LAST_SIX_MONTHS: NO

## 2025-05-21 ASSESSMENT — ASTHMA QUESTIONNAIRES
QUESTION_3 LAST FOUR WEEKS HOW OFTEN DID YOUR ASTHMA SYMPTOMS (WHEEZING, COUGHING, SHORTNESS OF BREATH, CHEST TIGHTNESS OR PAIN) WAKE YOU UP AT NIGHT OR EARLIER THAN USUAL IN THE MORNING: ONCE OR TWICE
QUESTION_2 LAST FOUR WEEKS HOW OFTEN HAVE YOU HAD SHORTNESS OF BREATH: ONCE OR TWICE A WEEK
QUESTION_4 LAST FOUR WEEKS HOW OFTEN HAVE YOU USED YOUR RESCUE INHALER OR NEBULIZER MEDICATION (SUCH AS ALBUTEROL): NOT AT ALL
QUESTION_4 LAST FOUR WEEKS HOW OFTEN HAVE YOU USED YOUR RESCUE INHALER OR NEBULIZER MEDICATION (SUCH AS ALBUTEROL): ONCE A WEEK OR LESS
QUESTION_5 LAST FOUR WEEKS HOW WOULD YOU RATE YOUR ASTHMA CONTROL: WELL CONTROLLED
QUESTION_2 LAST FOUR WEEKS HOW OFTEN HAVE YOU HAD SHORTNESS OF BREATH: THREE TO SIX TIMES A WEEK
ACUTE_EXACERBATION_TODAY: NO
QUESTION_5 LAST FOUR WEEKS HOW WOULD YOU RATE YOUR ASTHMA CONTROL: SOMEWHAT CONTROLLED
QUESTION_1 LAST FOUR WEEKS HOW MUCH OF THE TIME DID YOUR ASTHMA KEEP YOU FROM GETTING AS MUCH DONE AT WORK, SCHOOL OR AT HOME: A LITTLE OF THE TIME
ACT_TOTALSCORE: 20
QUESTION_1 LAST FOUR WEEKS HOW MUCH OF THE TIME DID YOUR ASTHMA KEEP YOU FROM GETTING AS MUCH DONE AT WORK, SCHOOL OR AT HOME: SOME OF THE TIME
QUESTION_3 LAST FOUR WEEKS HOW OFTEN DID YOUR ASTHMA SYMPTOMS (WHEEZING, COUGHING, SHORTNESS OF BREATH, CHEST TIGHTNESS OR PAIN) WAKE YOU UP AT NIGHT OR EARLIER THAN USUAL IN THE MORNING: NOT AT ALL
ACT_TOTALSCORE: 20

## 2025-05-21 ASSESSMENT — PAIN SCALES - GENERAL: PAINLEVEL_OUTOF10: NO PAIN (0)

## 2025-05-21 ASSESSMENT — SOCIAL DETERMINANTS OF HEALTH (SDOH): HOW OFTEN DO YOU GET TOGETHER WITH FRIENDS OR RELATIVES?: ONCE A WEEK

## 2025-05-21 NOTE — PROGRESS NOTES
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  Her  Don passed away 3 years ago she is coping that very well.  Her moods are very stable her falls risk is minimal for her age.  Fortunately her chronic pancreatitis has been quiet for over a year now without any abdominal discomfort bowels are moving on a regular basis overall she is quite happy with her life.  We did discuss ongoing care for her in the [facility] area.  She is a wonderful patient I recommended Dr. Sveta Aguilar.  I will miss taking care of this marvelous patient    Chronic recurrent pancreatitis (H)    - Comprehensive metabolic panel (BMP + Alb, Alk Phos, ALT, AST, Total. Bili, TP); Future  - CBC with platelets; Future  - Lipid panel reflex to direct LDL Fasting; Future  - Comprehensive metabolic panel (BMP + Alb, Alk Phos, ALT, AST, Total. Bili, TP)  - CBC with platelets  - Lipid panel reflex to direct LDL Fasting    Ulcerative rectosigmoiditis without complication (H)    - Comprehensive metabolic panel (BMP + Alb, Alk Phos, ALT, AST, Total. Bili, TP); Future  - Comprehensive metabolic panel (BMP + Alb, Alk Phos, ALT, AST, Total. Bili, TP)    Ulcerative colitis with complication, unspecified location (H)  She is quite stable with this disease  - CBC with platelets; Future  - CBC with platelets    Morbid obesity (H)  Weight is stable BMI up a bit but not increasing  - Lipid panel reflex to direct LDL Fasting; Future  - Lipid panel reflex to direct LDL Fasting    Hashimoto's thyroiditis  Same dosage of thyroid  - TSH WITH FREE T4 REFLEX; Future  - TSH WITH FREE T4 REFLEX    Mild  "intermittent asthma, unspecified whether complicated  This is for as needed use  - albuterol (PROAIR HFA/PROVENTIL HFA/VENTOLIN HFA) 108 (90 Base) MCG/ACT inhaler; Inhale 2 puffs into the lungs every 6 hours as needed for shortness of breath, wheezing or cough.    Patient has been advised of split billing requirements and indicates understanding: Yes        BMI  Estimated body mass index is 40.58 kg/m  as calculated from the following:    Height as of this encounter: 1.499 m (4' 11\").    Weight as of this encounter: 91.1 kg (200 lb 14.4 oz).       Counseling  Appropriate preventive services were addressed with this patient via screening, questionnaire, or discussion as appropriate for fall prevention, nutrition, physical activity, Tobacco-use cessation, social engagement, weight loss and cognition.  Checklist reviewing preventive services available has been given to the patient.  Reviewed patient's diet, addressing concerns and/or questions.   The patient was instructed to see the dentist every 6 months.   Information on urinary incontinence and treatment options given to patient.       Follow-up       Beth Kaur is a 79 year old, presenting for the following:  Recheck Medication and Wellness Visit        5/21/2025    12:16 PM   Additional Questions   Roomed by am cma      Do you have a current opioid prescription? no  Do you use any other controlled substances or medications that are not prescribed by a provider?  no          Advance Care Planning    Document on file is a Health Care Directive or POLST.        5/21/2025   General Health   How would you rate your overall physical health? Good   Feel stress (tense, anxious, or unable to sleep) Only a little   (!) STRESS CONCERN      5/21/2025   Nutrition   Diet: Regular (no restrictions)         5/21/2025   Exercise   Days per week of moderate/strenous exercise 0 days   (!) EXERCISE CONCERN      5/21/2025   Social Factors   Frequency of gathering with " friends or relatives Once a week   Worry food won't last until get money to buy more No   Food not last or not have enough money for food? No   Do you have housing? (Housing is defined as stable permanent housing and does not include staying outside in a car, in a tent, in an abandoned building, in an overnight shelter, or couch-surfing.) Yes   Are you worried about losing your housing? No   Lack of transportation? No   Unable to get utilities (heat,electricity)? No         5/21/2025   Fall Risk   Fallen 2 or more times in the past year? No    No    No   Trouble with walking or balance? No    No    No       Multiple values from one day are sorted in reverse-chronological order          5/21/2025   Activities of Daily Living- Home Safety   Needs help with the following daily activites None of the above   Safety concerns in the home None of the above         5/21/2025   Dental   Dentist two times every year? (!) NO         5/21/2025   Hearing Screening   Hearing concerns? None of the above         5/21/2025   Driving Risk Screening   Patient/family members have concerns about driving No         5/21/2025   General Alertness/Fatigue Screening   Have you been more tired than usual lately? No         5/21/2025   Urinary Incontinence Screening   Bothered by leaking urine in past 6 months Yes         Today's PHQ-2 Score:       5/21/2025    12:15 PM   PHQ-2 ( 1999 Pf[employer])   Q1: Little interest or pleasure in doing things 0   Q2: Feeling down, depressed or hopeless 0   PHQ-2 Score 0    Q1: Little interest or pleasure in doing things Not at all   Q2: Feeling down, depressed or hopeless Not at all   PHQ-2 Score 0       Patient-reported           5/21/2025   Substance Use   Alcohol more than 3/day or more than 7/wk No   Do you have a current opioid prescription? No   How severe/bad is pain from 1 to 10? 1/10   Do you use any other substances recreationally? No     Social History     Tobacco Use    Smoking status: Former      Current packs/day: 0.00     Types: Cigarettes     Quit date: 1997     Years since quittin.4    Smokeless tobacco: Never   Vaping Use    Vaping status: Never Used   Substance Use Topics    Alcohol use: No     Comment: Alcoholic Drinks/day: very rre social drink    Drug use: No           2022   LAST FHS-7 RESULTS   1st degree relative breast or ovarian cancer No   Any relative bilateral breast cancer No   Any male have breast cancer No   Any ONE woman have BOTH breast AND ovarian cancer No   Any woman with breast cancer before 50yrs No   2 or more relatives with breast AND/OR ovarian cancer No   2 or more relatives with breast AND/OR bowel cancer No            ASCVD Risk   The 10-year ASCVD risk score (Dragan JARVIS, et al., 2019) is: 34.4%    Values used to calculate the score:      Age: 79 years      Sex: Female      Is Non- : No      Diabetic: No      Tobacco smoker: No      Systolic Blood Pressure: 138 mmHg      Is BP treated: Yes      HDL Cholesterol: 48 mg/dL      Total Cholesterol: 207 mg/dL            Reviewed and updated as needed this visit by Provider                    Lab work is in process  Current providers sharing in care for this patient include:  Patient Care Team:  Niraj Biswas MD as PCP - General  Niraj Biswas MD as Assigned PCP  Uri Daily MD as Assigned Surgical Provider    The following health maintenance items are reviewed in Epic and correct as of today:  Health Maintenance   Topic Date Due    ASTHMA ACTION PLAN  Never done    MENINGITIS IMMUNIZATION (1 - Risk 2-dose series) Never done    HEPATITIS C SCREENING  Never done    RSV VACCINE (1 - 1-dose 75+ series) Never done    TSH W/FREE T4 REFLEX  2023    ANNUAL REVIEW OF HM ORDERS  2024    MEDICARE ANNUAL WELLNESS VISIT  2025    ASTHMA CONTROL TEST  2025    COVID-19 Vaccine (8 - Moderna risk - season) 05/15/2025    BMP  2025    FALL RISK ASSESSMENT   "05/21/2026    DTAP/TDAP/TD IMMUNIZATION (2 - Td or Tdap) 02/06/2027    DIABETES SCREENING  09/19/2027    LIPID  12/08/2027    ADVANCE CARE PLANNING  03/14/2029    DEXA  08/31/2032    PHQ-2 (once per calendar year)  Completed    INFLUENZA VACCINE  Completed    Pneumococcal Vaccine: 50+ Years  Completed    ZOSTER IMMUNIZATION  Completed    HPV IMMUNIZATION  Aged Out    MAMMO SCREENING  Discontinued    COLORECTAL CANCER SCREENING  Discontinued    LUNG CANCER SCREENING  Discontinued         Review of Systems  CONSTITUTIONAL: NEGATIVE for fever, chills, change in weight  INTEGUMENTARY/SKIN: NEGATIVE for worrisome rashes, moles or lesions  EYES: NEGATIVE for vision changes or irritation  ENT/MOUTH: NEGATIVE for ear, mouth and throat problems  RESP: NEGATIVE for significant cough or SOB  BREAST: NEGATIVE for masses, tenderness or discharge  CV: NEGATIVE for chest pain, palpitations or peripheral edema  GI: NEGATIVE for nausea, abdominal pain, heartburn, or change in bowel habits  : NEGATIVE for frequency, dysuria, or hematuria  MUSCULOSKELETAL: NEGATIVE for significant arthralgias or myalgia  NEURO: NEGATIVE for weakness, dizziness or paresthesias  ENDOCRINE: NEGATIVE for temperature intolerance, skin/hair changes  HEME: NEGATIVE for bleeding problems  PSYCHIATRIC: NEGATIVE for changes in mood or affect     Objective    Exam  /80   Pulse 59   Temp 97.8  F (36.6  C) (Oral)   Resp 20   Ht 1.499 m (4' 11\")   Wt 91.1 kg (200 lb 14.4 oz)   LMP  (LMP Unknown)   SpO2 96%   BMI 40.58 kg/m     Estimated body mass index is 40.58 kg/m  as calculated from the following:    Height as of this encounter: 1.499 m (4' 11\").    Weight as of this encounter: 91.1 kg (200 lb 14.4 oz).    Physical Exam  GENERAL: alert and no distress  NECK: no adenopathy, no asymmetry, masses, or scars  RESP: lungs clear to auscultation - no rales, rhonchi or wheezes  CV: regular rate and rhythm, normal S1 S2, no S3 or S4, no murmur, click or " rub, no peripheral edema  ABDOMEN: soft, nontender, no hepatosplenomegaly, no masses and bowel sounds normal  MS: no gross musculoskeletal defects noted, no edema        5/21/2025   Mini Cog   Clock Draw Score 2 Normal   3 Item Recall 3 objects recalled   Mini Cog Total Score 5              Signed Electronically by: Niraj Biswas MD

## 2025-05-22 LAB
ALBUMIN SERPL BCG-MCNC: 4.1 G/DL (ref 3.5–5.2)
ALP SERPL-CCNC: 123 U/L (ref 40–150)
ALT SERPL W P-5'-P-CCNC: 18 U/L (ref 0–50)
ANION GAP SERPL CALCULATED.3IONS-SCNC: 11 MMOL/L (ref 7–15)
AST SERPL W P-5'-P-CCNC: 70 U/L (ref 0–45)
BILIRUB SERPL-MCNC: 0.7 MG/DL
BUN SERPL-MCNC: 18.2 MG/DL (ref 8–23)
CALCIUM SERPL-MCNC: 10.8 MG/DL (ref 8.8–10.4)
CHLORIDE SERPL-SCNC: 102 MMOL/L (ref 98–107)
CHOLEST SERPL-MCNC: 206 MG/DL
CREAT SERPL-MCNC: 0.68 MG/DL (ref 0.51–0.95)
EGFRCR SERPLBLD CKD-EPI 2021: 88 ML/MIN/1.73M2
FASTING STATUS PATIENT QL REPORTED: ABNORMAL
FASTING STATUS PATIENT QL REPORTED: ABNORMAL
GLUCOSE SERPL-MCNC: 86 MG/DL (ref 70–99)
HCO3 SERPL-SCNC: 25 MMOL/L (ref 22–29)
HDLC SERPL-MCNC: 56 MG/DL
LDLC SERPL CALC-MCNC: 136 MG/DL
NONHDLC SERPL-MCNC: 150 MG/DL
POTASSIUM SERPL-SCNC: 5.1 MMOL/L (ref 3.4–5.3)
PROT SERPL-MCNC: 7.9 G/DL (ref 6.4–8.3)
SODIUM SERPL-SCNC: 138 MMOL/L (ref 135–145)
TRIGL SERPL-MCNC: 69 MG/DL
TSH SERPL DL<=0.005 MIU/L-ACNC: 0.95 UIU/ML (ref 0.3–4.2)

## 2025-05-24 ENCOUNTER — HEALTH MAINTENANCE LETTER (OUTPATIENT)
Age: 80
End: 2025-05-24

## 2025-05-27 ENCOUNTER — RESULTS FOLLOW-UP (OUTPATIENT)
Dept: FAMILY MEDICINE | Facility: CLINIC | Age: 80
End: 2025-05-27

## 2025-06-30 ENCOUNTER — TRANSFERRED RECORDS (OUTPATIENT)
Dept: MULTI SPECIALTY CLINIC | Facility: CLINIC | Age: 80
End: 2025-06-30
Payer: COMMERCIAL

## 2025-06-30 LAB
CREATININE (EXTERNAL): 0.64 MG/DL (ref 0.57–1)
GFR ESTIMATED (EXTERNAL): 90 ML/MIN/1.73

## 2025-07-01 ENCOUNTER — TRANSFERRED RECORDS (OUTPATIENT)
Dept: ADMINISTRATIVE | Facility: CLINIC | Age: 80
End: 2025-07-01
Payer: COMMERCIAL

## 2025-07-02 NOTE — PROCEDURES
2025        Natasha O'Tremmilton   [address] [place] [address]      Natasha Bowersmilton,  :  1945    Your labs are acceptable. Please continue to follow the routine lab protocol.  Creatinine 2025 07:35   Description Result Units Flags Range   Creatinine 0.64 mg/dL  0.57-1.00   eGFR 90 mL/min/1.73  >59   Comments   Performed At: [facility], [facility] [address], [place], [id number]  Richelle Scott MD, Phone: [phone]   BUN 2025 07:35   Description Result Units Flags Range   BUN 17 mg/dL  8-27   Comments   Performed At: [facility], [facility] [address], [place], [id number]  Richelle Scott MD, Phone: [phone]           Thank you.    Electronically signed by:  Sarah ROWE 2025 03:15 PM  Document generated by:  Sarah ROWE  2025  If your provider ordered multiple tests; the results may not become available at the same time.  If multiple test results are received within 14 days of one another, you may receive a duplicate.  cc:  Niraj Biswas MD  cc:  Niraj Biswas MD

## 2025-07-03 ENCOUNTER — OFFICE VISIT (OUTPATIENT)
Dept: FAMILY MEDICINE | Facility: CLINIC | Age: 80
End: 2025-07-03
Payer: COMMERCIAL

## 2025-07-03 VITALS
HEART RATE: 64 BPM | TEMPERATURE: 97.8 F | OXYGEN SATURATION: 95 % | BODY MASS INDEX: 41.23 KG/M2 | WEIGHT: 204.5 LBS | SYSTOLIC BLOOD PRESSURE: 139 MMHG | DIASTOLIC BLOOD PRESSURE: 63 MMHG | RESPIRATION RATE: 20 BRPM | HEIGHT: 59 IN

## 2025-07-03 DIAGNOSIS — Z12.31 VISIT FOR SCREENING MAMMOGRAM: ICD-10-CM

## 2025-07-03 DIAGNOSIS — E66.813 CLASS 3 SEVERE OBESITY WITH SERIOUS COMORBIDITY AND BODY MASS INDEX (BMI) OF 40.0 TO 44.9 IN ADULT (H): ICD-10-CM

## 2025-07-03 DIAGNOSIS — Z78.0 POSTMENOPAUSAL STATUS: Primary | ICD-10-CM

## 2025-07-03 DIAGNOSIS — K86.1 CHRONIC RECURRENT PANCREATITIS (H): ICD-10-CM

## 2025-07-03 DIAGNOSIS — K51.90 ULCERATIVE COLITIS WITHOUT COMPLICATIONS, UNSPECIFIED LOCATION (H): ICD-10-CM

## 2025-07-03 DIAGNOSIS — I10 BENIGN ESSENTIAL HYPERTENSION: ICD-10-CM

## 2025-07-03 DIAGNOSIS — Z23 NEED FOR VACCINATION: ICD-10-CM

## 2025-07-03 PROBLEM — E03.9 HYPOTHYROIDISM: Status: RESOLVED | Noted: 2022-12-06 | Resolved: 2025-07-03

## 2025-07-03 PROBLEM — R10.32 LEFT LOWER QUADRANT PAIN: Status: RESOLVED | Noted: 2022-12-08 | Resolved: 2025-07-03

## 2025-07-03 PROBLEM — E66.3 OVERWEIGHT: Status: RESOLVED | Noted: 2022-12-06 | Resolved: 2025-07-03

## 2025-07-03 PROBLEM — D12.6 BENIGN NEOPLASM OF COLON: Status: RESOLVED | Noted: 2019-12-02 | Resolved: 2025-07-03

## 2025-07-03 PROCEDURE — 3078F DIAST BP <80 MM HG: CPT | Performed by: FAMILY MEDICINE

## 2025-07-03 PROCEDURE — 90678 RSV VACC PREF BIVALENT IM: CPT | Performed by: FAMILY MEDICINE

## 2025-07-03 PROCEDURE — 90471 IMMUNIZATION ADMIN: CPT | Performed by: FAMILY MEDICINE

## 2025-07-03 PROCEDURE — 91320 SARSCV2 VAC 30MCG TRS-SUC IM: CPT | Performed by: FAMILY MEDICINE

## 2025-07-03 PROCEDURE — 3075F SYST BP GE 130 - 139MM HG: CPT | Performed by: FAMILY MEDICINE

## 2025-07-03 PROCEDURE — 90480 ADMN SARSCOV2 VAC 1/ONLY CMP: CPT | Performed by: FAMILY MEDICINE

## 2025-07-03 PROCEDURE — 99214 OFFICE O/P EST MOD 30 MIN: CPT | Mod: 25 | Performed by: FAMILY MEDICINE

## 2025-07-03 NOTE — PROGRESS NOTES
Assessment & Plan     Postmenopausal status  Patient has not had a bone density test since 2017, and had osteopenia at that time.  Recommend updated bone density test and patient is agreeable.  - DX Bone Density; Future    Visit for screening mammogram  Due for updated mammogram, discussed and ordered today.  - MA Screen Bilateral w/Howard; Future    Need for vaccination  COVID-19 and RSV vaccines updated today.  Will update other vaccines for splenectomy at next wellness visit.    Ulcerative colitis without complications, unspecified location (H)  Follows with [facility], on mesalamine.  Symptoms have been well-controlled.    Chronic recurrent pancreatitis (H)  No recent symptoms in this regard.    Class 3 severe obesity with serious comorbidity and body mass index (BMI) of 40.0 to 44.9 in adult (H)  Discussed a healthy, vegetable-rich diet and regular cardiovascular exercise.    Benign essential hypertension  Reasonably well-controlled on current doses of amlodipine and carvedilol.                Beth Kaur is a 79 year old, presenting for the following health issues:  Establish Care      7/3/2025    11:19 AM   Additional Questions   Roomed by Serene MONTIEL CMA   Accompanied by Daughter     History of Present Illness       Reason for visit:  New   She is taking medications regularly.        Patient presents today with her daughter Michelle for a visit to [facility].  Her physician of many years recently retired.  She relates that last year she lost about 40 pounds over 6 months and was very fatigued.  She ultimately was diagnosed with ulcerative colitis and has stabilized on mesalamine.  She follows with Dr. Parmar at [facility].  She also has a history of recurrent pancreatitis, but denies recent flares.  Her appetite has been good.  She has no new questions or concerns today.      Review of Systems  Constitutional, HEENT, cardiovascular, pulmonary, gi and gu systems are negative, except as otherwise noted.     "  Objective    /63 (BP Location: Left arm, Patient Position: Sitting, Cuff Size: Adult Large)   Pulse 64   Temp 97.8  F (36.6  C) (Oral)   Resp 20   Ht 1.499 m (4' 11\")   Wt 92.8 kg (204 lb 8 oz)   LMP  (LMP Unknown)   SpO2 95%   BMI 41.30 kg/m    Body mass index is 41.3 kg/m .  Physical Exam   GENERAL: alert and no distress  RESP: lungs clear to auscultation - no rales, rhonchi or wheezes  CV: regular rate and rhythm, normal S1 S2, no S3 or S4, no murmur, click or rub, no peripheral edema  NEURO: Normal strength and tone, mentation intact and speech normal  PSYCH: mentation appears normal, affect normal/bright    Diagnostics: None        Signed Electronically by: Ami Aguilar MD    "

## 2025-07-03 NOTE — PROGRESS NOTES
Prior to immunization administration, verified patients identity using patient s name and date of birth. Please see Immunization Activity for additional information.     Screening Questionnaire for Adult Immunization    Are you sick today?   No   Do you have allergies to medications, food, a vaccine component or latex?   Yes   Have you ever had a serious reaction after receiving a vaccination?   No   Do you have a long-term health problem with heart, lung, kidney, or metabolic disease (e.g., diabetes), asthma, a blood disorder, no spleen, complement component deficiency, a cochlear implant, or a spinal fluid leak?  Are you on long-term aspirin therapy?   No   Do you have cancer, leukemia, HIV/AIDS, or any other immune system problem?   No   Do you have a parent, brother, or sister with an immune system problem?   No   In the past 3 months, have you taken medications that affect  your immune system, such as prednisone, other steroids, or anticancer drugs; drugs for the treatment of rheumatoid arthritis, Crohn s disease, or psoriasis; or have you had radiation treatments?   No   Have you had a seizure, or a brain or other nervous system problem?   No   During the past year, have you received a transfusion of blood or blood    products, or been given immune (gamma) globulin or antiviral drug?   No   For women: Are you pregnant or is there a chance you could become       pregnant during the next month?   No   Have you received any vaccinations in the past 4 weeks?   Yes     Immunization questionnaire was positive for at least one answer.  Notified md.      Patient instructed to remain in clinic for 15 minutes afterwards, and to report any adverse reactions.     Screening performed by Linda Arreguin MA on 7/3/2025 at 12:22 PM.

## 2025-07-07 ENCOUNTER — PATIENT OUTREACH (OUTPATIENT)
Dept: CARE COORDINATION | Facility: CLINIC | Age: 80
End: 2025-07-07
Payer: COMMERCIAL

## 2025-07-22 DIAGNOSIS — I10 BENIGN ESSENTIAL HYPERTENSION: ICD-10-CM

## 2025-07-23 RX ORDER — CARVEDILOL 12.5 MG/1
TABLET ORAL
Qty: 180 TABLET | Refills: 2 | Status: SHIPPED | OUTPATIENT
Start: 2025-07-23

## 2025-07-23 RX ORDER — AMLODIPINE BESYLATE 5 MG/1
5 TABLET ORAL DAILY
Qty: 90 TABLET | Refills: 2 | Status: SHIPPED | OUTPATIENT
Start: 2025-07-23

## 2025-08-13 ENCOUNTER — HOSPITAL ENCOUNTER (OUTPATIENT)
Dept: BONE DENSITY | Facility: HOSPITAL | Age: 80
Discharge: HOME | End: 2025-08-13
Attending: FAMILY MEDICINE
Payer: COMMERCIAL

## 2025-08-13 ENCOUNTER — ANCILLARY PROCEDURE (OUTPATIENT)
Dept: MAMMOGRAPHY | Facility: HOSPITAL | Age: 80
End: 2025-08-13
Attending: FAMILY MEDICINE
Payer: COMMERCIAL

## 2025-08-13 DIAGNOSIS — Z12.31 VISIT FOR SCREENING MAMMOGRAM: ICD-10-CM

## 2025-08-13 DIAGNOSIS — Z78.0 POSTMENOPAUSAL STATUS: ICD-10-CM

## 2025-08-13 PROCEDURE — 77063 BREAST TOMOSYNTHESIS BI: CPT

## 2025-08-13 PROCEDURE — 77081 DXA BONE DENSITY APPENDICULR: CPT

## (undated) DEVICE — DAVINCI XI DRAPE ARM 470015

## (undated) DEVICE — DAVINCI XI OBTURATOR BLADELESS 8MM 470359

## (undated) DEVICE — DRAPE SHEET TABLE COVER KC 42301*

## (undated) DEVICE — DAVINCI HOT SHEARS TIP COVER  400180

## (undated) DEVICE — TUBING SMOKE EVAC PNEUMOCLEAR HIGH FLOW 0620050250

## (undated) DEVICE — GLOVE BIOGEL PI ORTHOPRO SZ 7.5 47675

## (undated) DEVICE — BNDG ABDOMINAL BINDER 9X45-62" 79-89071

## (undated) DEVICE — LUBRICANT INST ELECTROLUBE EL101

## (undated) DEVICE — DRAPE U SPLIT 74X120" 29440

## (undated) DEVICE — ENDO TROCAR FIRST ENTRY KII FIOS Z-THRD 05X100MM CTF03

## (undated) DEVICE — SU MONOCRYL+ 4-0 18IN PS2 UND MCP496G

## (undated) DEVICE — DAVINCI XI DRAPE COLUMN 470341

## (undated) DEVICE — POSITIONING KIT THE PINK PAD XL 40X20X1IN 40583 (COI)

## (undated) DEVICE — CUSTOM PACK LAP CHOLE SBA5BLCHEA

## (undated) DEVICE — DAVINCI XI SEAL UNIVERSAL 5-12MM 470500

## (undated) DEVICE — SU WND CLOSURE V-LOC 90 SZ 2-0 12" GS-21 VLOCM0315

## (undated) DEVICE — SOL WATER IRRIG 1000ML BOTTLE 2F7114

## (undated) DEVICE — CATH TRAY FOLEY SURESTEP 16FR DRAIN BAG STATOCK A899916

## (undated) DEVICE — DRAPE SHEET REV FOLD 3/4 9349

## (undated) DEVICE — ANTIFOG SOLUTION SEE SHARP 150M TROCAR SWABS 30978 (COI)

## (undated) DEVICE — PREP CHLORAPREP 26ML TINTED HI-LITE ORANGE 930815

## (undated) DEVICE — SYR 50ML SLIP TIP W/O NDL 309654

## (undated) DEVICE — SU WND CLOSURE V-LOC PBT SZ 0 18" GS-21 VLOCN0326

## (undated) DEVICE — DAVINCI XI OBTURATOR 8MM BLADELESS LONG 470360

## (undated) RX ORDER — FENTANYL CITRATE 50 UG/ML
INJECTION, SOLUTION INTRAMUSCULAR; INTRAVENOUS
Status: DISPENSED
Start: 2024-09-26

## (undated) RX ORDER — PROPOFOL 10 MG/ML
INJECTION, EMULSION INTRAVENOUS
Status: DISPENSED
Start: 2024-09-26

## (undated) RX ORDER — DEXAMETHASONE SODIUM PHOSPHATE 10 MG/ML
INJECTION, SOLUTION INTRAMUSCULAR; INTRAVENOUS
Status: DISPENSED
Start: 2024-09-26

## (undated) RX ORDER — ONDANSETRON 2 MG/ML
INJECTION INTRAMUSCULAR; INTRAVENOUS
Status: DISPENSED
Start: 2024-09-26

## (undated) RX ORDER — KETOROLAC TROMETHAMINE 30 MG/ML
INJECTION, SOLUTION INTRAMUSCULAR; INTRAVENOUS
Status: DISPENSED
Start: 2024-09-26

## (undated) RX ORDER — LIDOCAINE HYDROCHLORIDE 10 MG/ML
INJECTION, SOLUTION EPIDURAL; INFILTRATION; INTRACAUDAL; PERINEURAL
Status: DISPENSED
Start: 2024-09-26

## (undated) RX ORDER — CEFAZOLIN SODIUM 1 G/3ML
INJECTION, POWDER, FOR SOLUTION INTRAMUSCULAR; INTRAVENOUS
Status: DISPENSED
Start: 2024-09-26

## (undated) RX ORDER — BUPIVACAINE HYDROCHLORIDE 2.5 MG/ML
INJECTION, SOLUTION INFILTRATION; PERINEURAL
Status: DISPENSED
Start: 2024-09-26

## (undated) RX ORDER — EPHEDRINE SULFATE 50 MG/ML
INJECTION, SOLUTION INTRAMUSCULAR; INTRAVENOUS; SUBCUTANEOUS
Status: DISPENSED
Start: 2024-09-26